# Patient Record
Sex: FEMALE | Race: WHITE | Employment: FULL TIME | URBAN - NONMETROPOLITAN AREA
[De-identification: names, ages, dates, MRNs, and addresses within clinical notes are randomized per-mention and may not be internally consistent; named-entity substitution may affect disease eponyms.]

---

## 2017-05-19 ENCOUNTER — PROCEDURE VISIT (OUTPATIENT)
Dept: PHYSICAL MEDICINE AND REHAB | Age: 32
End: 2017-05-19

## 2017-05-19 DIAGNOSIS — M54.16 LUMBAR RADICULOPATHY: Primary | ICD-10-CM

## 2017-05-19 DIAGNOSIS — M79.604 RIGHT LEG PAIN: ICD-10-CM

## 2017-05-19 PROCEDURE — 95908 NRV CNDJ TST 3-4 STUDIES: CPT | Performed by: PSYCHIATRY & NEUROLOGY

## 2017-05-19 PROCEDURE — 95886 MUSC TEST DONE W/N TEST COMP: CPT | Performed by: PSYCHIATRY & NEUROLOGY

## 2018-01-25 ENCOUNTER — HOSPITAL ENCOUNTER (EMERGENCY)
Age: 33
Discharge: HOME | End: 2018-01-25
Attending: FAMILY MEDICINE
Payer: COMMERCIAL

## 2018-01-25 VITALS
OXYGEN SATURATION: 99 % | HEIGHT: 69 IN | BODY MASS INDEX: 30.07 KG/M2 | SYSTOLIC BLOOD PRESSURE: 135 MMHG | RESPIRATION RATE: 16 BRPM | DIASTOLIC BLOOD PRESSURE: 88 MMHG | HEART RATE: 58 BPM | WEIGHT: 203 LBS | TEMPERATURE: 97.8 F

## 2018-01-25 DIAGNOSIS — J40 BRONCHITIS: ICD-10-CM

## 2018-01-25 DIAGNOSIS — G89.18 POST-OP PAIN: Primary | ICD-10-CM

## 2018-01-25 PROCEDURE — 99282 EMERGENCY DEPT VISIT SF MDM: CPT

## 2018-01-25 RX ORDER — HYDROCODONE BITARTRATE AND ACETAMINOPHEN 5; 325 MG/1; MG/1
1 TABLET ORAL EVERY 6 HOURS PRN
COMMUNITY
End: 2018-01-25 | Stop reason: SINTOL

## 2018-01-25 RX ORDER — SERTRALINE HYDROCHLORIDE 100 MG/1
100 TABLET, FILM COATED ORAL DAILY
COMMUNITY
End: 2018-07-05

## 2018-01-25 RX ORDER — OXYCODONE HYDROCHLORIDE AND ACETAMINOPHEN 5; 325 MG/1; MG/1
1-2 TABLET ORAL EVERY 6 HOURS PRN
Qty: 12 TABLET | Refills: 0 | Status: SHIPPED | OUTPATIENT
Start: 2018-01-25 | End: 2018-02-01

## 2018-01-25 RX ORDER — IBUPROFEN 200 MG
800 TABLET ORAL EVERY 6 HOURS PRN
Status: ON HOLD | COMMUNITY
End: 2019-08-27

## 2018-01-25 ASSESSMENT — ENCOUNTER SYMPTOMS
ABDOMINAL DISTENTION: 0
PHOTOPHOBIA: 0
SHORTNESS OF BREATH: 0
RHINORRHEA: 0
NAUSEA: 1
TROUBLE SWALLOWING: 1
WHEEZING: 1
COUGH: 1
FACIAL SWELLING: 0
CHEST TIGHTNESS: 0
DIARRHEA: 0
EYE REDNESS: 0
COLOR CHANGE: 0
VOICE CHANGE: 0
ABDOMINAL PAIN: 0
EYE PAIN: 0
SORE THROAT: 1
EYE DISCHARGE: 0
CONSTIPATION: 0
STRIDOR: 0

## 2018-01-25 ASSESSMENT — PAIN DESCRIPTION - PAIN TYPE: TYPE: SURGICAL PAIN

## 2018-01-25 ASSESSMENT — PAIN DESCRIPTION - DESCRIPTORS: DESCRIPTORS: BURNING;THROBBING

## 2018-01-25 ASSESSMENT — PAIN DESCRIPTION - FREQUENCY: FREQUENCY: CONTINUOUS

## 2018-01-25 ASSESSMENT — PAIN DESCRIPTION - LOCATION: LOCATION: THROAT

## 2018-01-25 ASSESSMENT — PAIN SCALES - GENERAL: PAINLEVEL_OUTOF10: 10

## 2018-01-25 NOTE — ED PROVIDER NOTES
noted that she was having some coughing, and wheezing. The patient is a smoker I did recommend that she stop smoking. Lung auscultation does show some wheezing. The patient has just recently stopped her prednisone dosing and I did recommend if her symptoms worsen that a beta 2 agonist may need to be added to her regimen. I will provide the patient a short regimen of Percocet for her pain. I recommended that the patient notes any fever or worsening throat pain that she notify her ENT immediately. The patient voiced understanding. CRITICAL CARE:       CONSULTS:  None    PROCEDURES:  None    FINAL IMPRESSION      1. Post-op pain    2. Bronchitis          DISPOSITION/PLAN   Home. Care instructions provided. Follow up with PCP/ENT if symptoms should persist    PATIENT REFERRED TO:  Airam Andre MD  [place] [address] [address]  [phone]    Call in 1 day  If symptoms worsen      DISCHARGE MEDICATIONS:  New Prescriptions    OXYCODONE-ACETAMINOPHEN (PERCOCET) 5-325 MG PER TABLET    Take 1-2 tablets by mouth every 6 hours as needed for Pain for up to 10 doses WARNING:  May cause drowsiness. May impair ability to operate vehicles or machinery. Do not use in combination with alcohol.   .       (Please note that portions of this note were completed with a voice recognition program.  Efforts were made to edit the dictations but occasionally words are mis-transcribed.)    MD Mk Hill MD  01/25/18 3839

## 2018-07-05 ENCOUNTER — HOSPITAL ENCOUNTER (EMERGENCY)
Age: 33
Discharge: HOME | End: 2018-07-05
Attending: EMERGENCY MEDICINE
Payer: COMMERCIAL

## 2018-07-05 VITALS
WEIGHT: 210 LBS | HEART RATE: 105 BPM | BODY MASS INDEX: 31.01 KG/M2 | OXYGEN SATURATION: 96 % | DIASTOLIC BLOOD PRESSURE: 78 MMHG | SYSTOLIC BLOOD PRESSURE: 129 MMHG | RESPIRATION RATE: 16 BRPM | TEMPERATURE: 99.5 F

## 2018-07-05 DIAGNOSIS — S46.001A ROTATOR CUFF INJURY, RIGHT, INITIAL ENCOUNTER: ICD-10-CM

## 2018-07-05 DIAGNOSIS — S46.911A STRAIN OF RIGHT SHOULDER, INITIAL ENCOUNTER: Primary | ICD-10-CM

## 2018-07-05 PROCEDURE — 99283 EMERGENCY DEPT VISIT LOW MDM: CPT

## 2018-07-05 RX ORDER — BUPROPION HYDROCHLORIDE 100 MG/1
125 TABLET ORAL DAILY
COMMUNITY
End: 2019-03-20

## 2018-07-05 ASSESSMENT — PAIN DESCRIPTION - PAIN TYPE: TYPE: ACUTE PAIN

## 2018-07-05 ASSESSMENT — PAIN DESCRIPTION - DESCRIPTORS: DESCRIPTORS: ACHING;BURNING;SHARP

## 2018-07-05 ASSESSMENT — PAIN DESCRIPTION - ORIENTATION: ORIENTATION: RIGHT

## 2018-07-05 ASSESSMENT — PAIN SCALES - GENERAL: PAINLEVEL_OUTOF10: 8

## 2018-07-05 ASSESSMENT — PAIN DESCRIPTION - LOCATION: LOCATION: SHOULDER

## 2018-07-05 NOTE — ED NOTES
Malvin Peterson MD at bedside for evaluation of the patient.           Ivonne Cano RN  07/05/18 4967

## 2018-07-05 NOTE — LETTER
Mildred Lamb  [address] [place] [address]  Phone: [phone]               July 5, 2018    Patient: Sylvia Hernandez   YOB: 1985   Date of Visit: 7/5/2018       To Whom It May Concern:    Rob Smith was seen and treated in our emergency department on 7/5/2018. She may return to work on 7/6/2018.       Sincerely,       Jeremy Redman MD         Signature:__________________________________

## 2018-07-05 NOTE — ED PROVIDER NOTES
has been smoking Cigarettes. She has a 6.00 pack-year smoking history. She has never used smokeless tobacco. She reports that she does not drink alcohol or use drugs. PHYSICAL EXAM     INITIAL VITALS:  weight is 210 lb (95.3 kg). Her temperature is 99.5 °F (37.5 °C). Her blood pressure is 129/78 and her pulse is 105. Her respiration is 16 and oxygen saturation is 96%. Physical Exam   Constitutional: She appears well-developed and well-nourished. No distress. HENT:   Head: Atraumatic. Neck: Normal range of motion. Neck supple. Musculoskeletal:    Examination of the  Of the right shoulder showed normal contour, she has slight pain with  Passive abduction of her right shoulder, she is able  2 days arm over head with minimal pain. She has normal neurovascular examination of right upper extremity. Neurological: She is alert. DIAGNOSTIC RESULTS         LABS:   Labs Reviewed - No data to display    EMERGENCY DEPARTMENT COURSE:   Vitals:    Vitals:    07/05/18 1915   BP: 129/78   Pulse: 105   Resp: 16   Temp: 99.5 °F (37.5 °C)   SpO2: 96%   Weight: 210 lb (95.3 kg)         FINAL IMPRESSION      1. Strain of right shoulder, initial encounter    2. Rotator cuff injury, right, initial encounter          DISPOSITION/PLAN    she was discharged home in stable condition with discharge instructions, advised take Motrin 800 mg every 8 hours as needed, return if worse or new symptoms. PATIENT REFERRED TO:  Cadence Lopez, APRN - CNP  [address]  [facility] [id number]  [phone]    In 2 days        DISCHARGE MEDICATIONS:  New Prescriptions    No medications on file       (Please note that portions of this note were completed with a voice recognition program.  Efforts were made to edit the dictations but occasionally words are mis-transcribed.)    MD Sylvain Dexter MD  07/05/18 6325

## 2018-07-05 NOTE — ED TRIAGE NOTES
Patient presents to the ED via private auto with complaints of right shoulder pain without known injury. Patient states that she woke this morning and her shoulder did not hurt at all but as soon as she got to work it started to hurt and she has increased pain with movement. Patient denies a work injury or lifting anything heavy at work today stating \"I just got to work and had not done anything yet. \"  Patient's pain level is the highest when she is abducting the arm and it is at about a 45 degree angle from the body. States she has occasional numbness and tingling in the arm if she is laying on it. Radial pulse strong.

## 2019-03-20 ENCOUNTER — HOSPITAL ENCOUNTER (EMERGENCY)
Age: 34
Discharge: HOME | End: 2019-03-20
Attending: EMERGENCY MEDICINE
Payer: COMMERCIAL

## 2019-03-20 VITALS
SYSTOLIC BLOOD PRESSURE: 124 MMHG | TEMPERATURE: 98 F | RESPIRATION RATE: 17 BRPM | DIASTOLIC BLOOD PRESSURE: 84 MMHG | OXYGEN SATURATION: 98 % | HEART RATE: 95 BPM

## 2019-03-20 DIAGNOSIS — Z86.59 HISTORY OF ANXIETY: ICD-10-CM

## 2019-03-20 DIAGNOSIS — Z72.0 NICOTINE ABUSE: ICD-10-CM

## 2019-03-20 DIAGNOSIS — J06.9 ACUTE UPPER RESPIRATORY INFECTION: Primary | ICD-10-CM

## 2019-03-20 PROCEDURE — 99283 EMERGENCY DEPT VISIT LOW MDM: CPT

## 2019-03-20 ASSESSMENT — PAIN DESCRIPTION - LOCATION: LOCATION: NOSE

## 2019-03-20 ASSESSMENT — PAIN DESCRIPTION - PAIN TYPE: TYPE: ACUTE PAIN

## 2019-03-20 ASSESSMENT — PAIN DESCRIPTION - DESCRIPTORS: DESCRIPTORS: PRESSURE

## 2019-03-20 ASSESSMENT — PAIN SCALES - GENERAL: PAINLEVEL_OUTOF10: 3

## 2019-03-20 ASSESSMENT — PAIN - FUNCTIONAL ASSESSMENT: PAIN_FUNCTIONAL_ASSESSMENT: ACTIVITIES ARE NOT PREVENTED

## 2019-04-26 ENCOUNTER — HOSPITAL ENCOUNTER (OUTPATIENT)
Age: 34
Discharge: HOME | End: 2019-04-26
Payer: COMMERCIAL

## 2019-04-26 LAB
ABO: NORMAL
ANTIBODY SCREEN: NORMAL
BASOPHILS # BLD: 0.6 %
BASOPHILS ABSOLUTE: 0 THOU/MM3 (ref 0–0.1)
EOSINOPHIL # BLD: 1.3 %
EOSINOPHILS ABSOLUTE: 0.1 THOU/MM3 (ref 0–0.4)
ERYTHROCYTE [DISTWIDTH] IN BLOOD BY AUTOMATED COUNT: 13.1 % (ref 11.5–14.5)
ERYTHROCYTE [DISTWIDTH] IN BLOOD BY AUTOMATED COUNT: 44.7 FL (ref 35–45)
HCT VFR BLD CALC: 41 % (ref 37–47)
HEMOGLOBIN: 13.4 GM/DL (ref 12–16)
HEPATITIS B SURFACE ANTIGEN: NEGATIVE
IMMATURE GRANS (ABS): 0.02 THOU/MM3 (ref 0–0.07)
IMMATURE GRANULOCYTES: 0.3 %
LYMPHOCYTES # BLD: 26.1 %
LYMPHOCYTES ABSOLUTE: 1.7 THOU/MM3 (ref 1–4.8)
MCH RBC QN AUTO: 30.5 PG (ref 26–33)
MCHC RBC AUTO-ENTMCNC: 32.7 GM/DL (ref 32.2–35.5)
MCV RBC AUTO: 93.4 FL (ref 81–99)
MONOCYTES # BLD: 7.8 %
MONOCYTES ABSOLUTE: 0.5 THOU/MM3 (ref 0.4–1.3)
NUCLEATED RED BLOOD CELLS: 0 /100 WBC
PLATELET # BLD: 172 THOU/MM3 (ref 130–400)
PMV BLD AUTO: 11.3 FL (ref 9.4–12.4)
RBC # BLD: 4.39 MILL/MM3 (ref 4.2–5.4)
RH FACTOR: NORMAL
RUBELLA: 59 IU/ML
SEG NEUTROPHILS: 63.9 %
SEGMENTED NEUTROPHILS ABSOLUTE COUNT: 4.1 THOU/MM3 (ref 1.8–7.7)
WBC # BLD: 6.4 THOU/MM3 (ref 4.8–10.8)

## 2019-04-26 PROCEDURE — 86901 BLOOD TYPING SEROLOGIC RH(D): CPT

## 2019-04-26 PROCEDURE — 36415 COLL VENOUS BLD VENIPUNCTURE: CPT

## 2019-04-26 PROCEDURE — 86762 RUBELLA ANTIBODY: CPT

## 2019-04-26 PROCEDURE — 86900 BLOOD TYPING SEROLOGIC ABO: CPT

## 2019-04-26 PROCEDURE — 86850 RBC ANTIBODY SCREEN: CPT

## 2019-04-26 PROCEDURE — 85025 COMPLETE CBC W/AUTO DIFF WBC: CPT

## 2019-04-26 PROCEDURE — 86592 SYPHILIS TEST NON-TREP QUAL: CPT

## 2019-04-26 PROCEDURE — 87340 HEPATITIS B SURFACE AG IA: CPT

## 2019-04-27 LAB — RPR: NONREACTIVE

## 2019-05-01 ENCOUNTER — HOSPITAL ENCOUNTER (EMERGENCY)
Age: 34
Discharge: HOME | End: 2019-05-01
Attending: EMERGENCY MEDICINE
Payer: COMMERCIAL

## 2019-05-01 VITALS
DIASTOLIC BLOOD PRESSURE: 78 MMHG | HEART RATE: 96 BPM | BODY MASS INDEX: 31.84 KG/M2 | SYSTOLIC BLOOD PRESSURE: 132 MMHG | RESPIRATION RATE: 18 BRPM | TEMPERATURE: 98.3 F | HEIGHT: 69 IN | OXYGEN SATURATION: 100 % | WEIGHT: 215 LBS

## 2019-05-01 DIAGNOSIS — N93.9 VAGINAL BLEEDING: Primary | ICD-10-CM

## 2019-05-01 DIAGNOSIS — O20.0 THREATENED MISCARRIAGE: ICD-10-CM

## 2019-05-01 LAB — HCG,BETA SUBUNIT,QUAL,SERUM: ABNORMAL MIU/ML (ref 0–5)

## 2019-05-01 PROCEDURE — 99284 EMERGENCY DEPT VISIT MOD MDM: CPT

## 2019-05-01 PROCEDURE — 84702 CHORIONIC GONADOTROPIN TEST: CPT

## 2019-05-01 PROCEDURE — 36415 COLL VENOUS BLD VENIPUNCTURE: CPT

## 2019-05-01 RX ORDER — ACETAMINOPHEN 500 MG
500 TABLET ORAL EVERY 6 HOURS PRN
COMMUNITY
End: 2022-08-24 | Stop reason: ALTCHOICE

## 2019-05-01 ASSESSMENT — ENCOUNTER SYMPTOMS
WHEEZING: 0
BLOOD IN STOOL: 0
SHORTNESS OF BREATH: 0
DIARRHEA: 0
VOMITING: 0
ABDOMINAL PAIN: 0
SORE THROAT: 0

## 2019-05-01 NOTE — LETTER
Caitlyn   [address] [address] [place] [address]  Phone: [phone]               May 1, 2019    Patient: Pamela Dorsey   YOB: 1985   Date of Visit: 5/1/2019       To Whom It May Concern:    Sathya Hdez was seen and treated in our emergency department on 5/1/2019. She may return to work on 5/2/2019.       Sincerely,       Thalia Olvera MD         Signature:__________________________________

## 2019-05-01 NOTE — ED NOTES
Blade Lima, lab personnel, in to draw blood for lab work as ordered.       Kwame Ba RN  05/01/19 2596

## 2019-05-01 NOTE — ED NOTES
Discharged home in stable condition. AVS discussed/reviewed with patient. Patient verbalized understanding of all instructions given; no questions/concerns voiced. Return to work slip given per patient request. Respirations easy, regular and non-labored; no distress noted. Skin pink, warm and dry; mucous membranes moist. Alert and appropriate for age. Ambulated per self to private vehicle.      Loretta Kiran RN  05/01/19 4076

## 2019-05-01 NOTE — ED NOTES
Dr. Pack Boom in to do pelvic exam. This RN present for exam. Patient tolerated well.      Vijay Desir, ANTIOEN  05/01/19 2301

## 2019-05-01 NOTE — ED PROVIDER NOTES
Medications    ACETAMINOPHEN (TYLENOL) 500 MG TABLET    Take 500 mg by mouth every 6 hours as needed for Pain    ENOXAPARIN (LOVENOX) 40 MG/0.4ML INJECTION    Inject into the skin daily    IBUPROFEN (ADVIL;MOTRIN) 200 MG TABLET    Take 800 mg by mouth every 6 hours as needed for Pain     PRENATAL MULTIVIT-MIN-FE-FA (PRENATAL VITAMINS) 0.8 MG TABS    Take by mouth       ALLERGIES     is allergic to tape [adhesive tape]. FAMILY HISTORY     indicated that her mother is alive. She indicated that her father is alive. She indicated that her brother is alive. She indicated that the status of her maternal grandmother is unknown. She indicated that the status of her maternal grandfather is unknown.   family history includes Heart Disease in her maternal grandfather; High Blood Pressure in her maternal grandmother; Other in her maternal grandmother. SOCIAL HISTORY      reports that she has been smoking cigarettes. She has a 6.00 pack-year smoking history. She has never used smokeless tobacco. She reports that she does not drink alcohol or use drugs. PHYSICAL EXAM     INITIAL VITALS:  height is 5' 9\" (1.753 m) and weight is 215 lb (97.5 kg). Her oral temperature is 98.3 °F (36.8 °C). Her blood pressure is 132/78 and her pulse is 96. Her respiration is 18 and oxygen saturation is 100%. Physical Exam   Constitutional: She appears well-developed and well-nourished. No distress. Eyes: No scleral icterus. Neck: Normal range of motion. Neck supple. No JVD present. Cardiovascular: Normal rate, regular rhythm and normal heart sounds. Exam reveals no gallop and no friction rub. No murmur heard. Pulmonary/Chest: Effort normal and breath sounds normal. No respiratory distress. Abdominal: Soft. Bowel sounds are normal. She exhibits no distension and no mass. There is no tenderness. Genitourinary: There is no lesion on the right labia. There is no lesion on the left labia.  Cervix exhibits no discharge and no friability. There is bleeding in the vagina. No vaginal discharge found. Genitourinary Comments: The cervical os is closed, there is no active bleeding. There is a very small amount of fresh blood in the vaginal vault. Musculoskeletal: She exhibits no edema or tenderness. Neurological: She is alert. Psychiatric: She has a normal mood and affect. DIFFERENTIAL DIAGNOSIS:       DIAGNOSTIC RESULTS     LABS:   Labs Reviewed   HCG, QUANTITATIVE, PREGNANCY       EMERGENCY DEPARTMENT COURSE:   Vitals:    Vitals:    05/01/19 0209   BP: 132/78   Pulse: 96   Resp: 18   Temp: 98.3 °F (36.8 °C)   TempSrc: Oral   SpO2: 100%   Weight: 215 lb (97.5 kg)   Height: 5' 9\" (1.753 m)     Quantitative beta hCG was obtained    FINAL IMPRESSION      1. Vaginal bleeding    2. Threatened miscarriage          DISPOSITION/PLAN   She was discharged home in stable condition, she was given discharge instructions and was advised to return if worse or new symptoms.     PATIENT REFERRED TO:  DO John Martinez [address] [place] [address]  [phone]    In 1 day        DISCHARGE MEDICATIONS:  New Prescriptions    No medications on file       (Please note that portions of this note were completed with a voice recognition program.  Efforts were made to edit the dictations but occasionally words are mis-transcribed.)    MD Emiliano Lomeli MD  05/01/19 8645

## 2019-05-01 NOTE — ED NOTES
Warm blanket given; patient instructed to take pants and underwear for exam. Chux placed on bed.      Kwame Ba RN  05/01/19 2570

## 2019-05-01 NOTE — ED TRIAGE NOTES
Patient presents per ambulation/self with c/o vaginal bleeding with possible miscarriage. Patient states she is 7 1/2 weeks pregnant and woke up about 0130 with her underwear all bloody. Denies any cramping or noting of clots. Respirations easy, regular and non-labored; no distress noted. Skin pink, warm and dry; mucous membranes moist. Alert and appropriate for age. Ambulated to exam room 4 for triage. Patient sitting in chair. Warm blanket offered. Dr. Marilou Mead made aware of patient.

## 2019-06-07 ENCOUNTER — HOSPITAL ENCOUNTER (OUTPATIENT)
Age: 34
Discharge: HOME | End: 2019-06-07
Payer: COMMERCIAL

## 2019-06-07 LAB
BASOPHILS # BLD: 0.4 %
BASOPHILS ABSOLUTE: 0 THOU/MM3 (ref 0–0.1)
EOSINOPHIL # BLD: 0.9 %
EOSINOPHILS ABSOLUTE: 0.1 THOU/MM3 (ref 0–0.4)
ERYTHROCYTE [DISTWIDTH] IN BLOOD BY AUTOMATED COUNT: 12.9 % (ref 11.5–14.5)
ERYTHROCYTE [DISTWIDTH] IN BLOOD BY AUTOMATED COUNT: 43.7 FL (ref 35–45)
HCT VFR BLD CALC: 36.7 % (ref 37–47)
HEMOGLOBIN: 12.2 GM/DL (ref 12–16)
IMMATURE GRANS (ABS): 0.02 THOU/MM3 (ref 0–0.07)
IMMATURE GRANULOCYTES: 0.3 %
LYMPHOCYTES # BLD: 20.4 %
LYMPHOCYTES ABSOLUTE: 1.5 THOU/MM3 (ref 1–4.8)
MCH RBC QN AUTO: 30.8 PG (ref 26–33)
MCHC RBC AUTO-ENTMCNC: 33.2 GM/DL (ref 32.2–35.5)
MCV RBC AUTO: 92.7 FL (ref 81–99)
MONOCYTES # BLD: 6 %
MONOCYTES ABSOLUTE: 0.5 THOU/MM3 (ref 0.4–1.3)
NUCLEATED RED BLOOD CELLS: 0 /100 WBC
PLATELET # BLD: 177 THOU/MM3 (ref 130–400)
PMV BLD AUTO: 11.5 FL (ref 9.4–12.4)
RBC # BLD: 3.96 MILL/MM3 (ref 4.2–5.4)
SEG NEUTROPHILS: 72 %
SEGMENTED NEUTROPHILS ABSOLUTE COUNT: 5.4 THOU/MM3 (ref 1.8–7.7)
WBC # BLD: 7.5 THOU/MM3 (ref 4.8–10.8)

## 2019-06-07 PROCEDURE — 36415 COLL VENOUS BLD VENIPUNCTURE: CPT

## 2019-06-07 PROCEDURE — 85520 HEPARIN ASSAY: CPT

## 2019-06-07 PROCEDURE — 85025 COMPLETE CBC W/AUTO DIFF WBC: CPT

## 2019-06-08 LAB — HEPARIN LOW MOLECULAR WEIGHT: 0.06 U/ML

## 2019-08-09 ENCOUNTER — HOSPITAL ENCOUNTER (OUTPATIENT)
Age: 34
Discharge: HOME | End: 2019-08-09
Payer: COMMERCIAL

## 2019-08-09 LAB
BASOPHILS # BLD: 0.2 %
BASOPHILS ABSOLUTE: 0 THOU/MM3 (ref 0–0.1)
EOSINOPHIL # BLD: 1 %
EOSINOPHILS ABSOLUTE: 0.1 THOU/MM3 (ref 0–0.4)
ERYTHROCYTE [DISTWIDTH] IN BLOOD BY AUTOMATED COUNT: 12.9 % (ref 11.5–14.5)
ERYTHROCYTE [DISTWIDTH] IN BLOOD BY AUTOMATED COUNT: 43.8 FL (ref 35–45)
HCT VFR BLD CALC: 40.2 % (ref 37–47)
HEMOGLOBIN: 13 GM/DL (ref 12–16)
HEPARIN LOW MOLECULAR WEIGHT: 0.03 U/ML
IMMATURE GRANS (ABS): 0.04 THOU/MM3 (ref 0–0.07)
IMMATURE GRANULOCYTES: 0.5 %
LYMPHOCYTES # BLD: 19.8 %
LYMPHOCYTES ABSOLUTE: 1.7 THOU/MM3 (ref 1–4.8)
MCH RBC QN AUTO: 30.2 PG (ref 26–33)
MCHC RBC AUTO-ENTMCNC: 32.3 GM/DL (ref 32.2–35.5)
MCV RBC AUTO: 93.5 FL (ref 81–99)
MONOCYTES # BLD: 5.5 %
MONOCYTES ABSOLUTE: 0.5 THOU/MM3 (ref 0.4–1.3)
NUCLEATED RED BLOOD CELLS: 0 /100 WBC
PLATELET # BLD: 158 THOU/MM3 (ref 130–400)
PMV BLD AUTO: 12 FL (ref 9.4–12.4)
RBC # BLD: 4.3 MILL/MM3 (ref 4.2–5.4)
SEG NEUTROPHILS: 73 %
SEGMENTED NEUTROPHILS ABSOLUTE COUNT: 6.1 THOU/MM3 (ref 1.8–7.7)
WBC # BLD: 8.4 THOU/MM3 (ref 4.8–10.8)

## 2019-08-09 PROCEDURE — 36415 COLL VENOUS BLD VENIPUNCTURE: CPT

## 2019-08-09 PROCEDURE — 85520 HEPARIN ASSAY: CPT

## 2019-08-09 PROCEDURE — 85025 COMPLETE CBC W/AUTO DIFF WBC: CPT

## 2019-08-27 PROBLEM — R10.9 ABDOMINAL PAIN: Status: ACTIVE | Noted: 2019-08-27

## 2019-09-08 ENCOUNTER — HOSPITAL ENCOUNTER (OUTPATIENT)
Age: 34
Discharge: HOME | End: 2019-09-08
Payer: COMMERCIAL

## 2019-09-08 LAB
BASOPHILS # BLD: 0.5 %
BASOPHILS ABSOLUTE: 0 THOU/MM3 (ref 0–0.1)
EOSINOPHIL # BLD: 1.3 %
EOSINOPHILS ABSOLUTE: 0.1 THOU/MM3 (ref 0–0.4)
ERYTHROCYTE [DISTWIDTH] IN BLOOD BY AUTOMATED COUNT: 13.8 % (ref 11.5–14.5)
ERYTHROCYTE [DISTWIDTH] IN BLOOD BY AUTOMATED COUNT: 46 FL (ref 35–45)
GESTATIONAL GLUCOSE SCREEN: 110 MG/DL (ref 69–140)
GESTATIONAL SCREEN BASELINE: 82 MG/DL (ref 70–108)
HCT VFR BLD CALC: 38.3 % (ref 37–47)
HEMOGLOBIN: 12.7 GM/DL (ref 12–16)
IMMATURE GRANS (ABS): 0.03 THOU/MM3 (ref 0–0.07)
IMMATURE GRANULOCYTES: 0 %
LYMPHOCYTES # BLD: 26.8 %
LYMPHOCYTES ABSOLUTE: 2.1 THOU/MM3 (ref 1–4.8)
MCH RBC QN AUTO: 30.5 PG (ref 26–33)
MCHC RBC AUTO-ENTMCNC: 33.2 GM/DL (ref 32.2–35.5)
MCV RBC AUTO: 92.1 FL (ref 81–99)
MONOCYTES # BLD: 7.3 %
MONOCYTES ABSOLUTE: 0.6 THOU/MM3 (ref 0.4–1.3)
NUCLEATED RED BLOOD CELLS: 0 /100 WBC
PLATELET # BLD: 141 THOU/MM3 (ref 130–400)
PMV BLD AUTO: 12.7 FL (ref 9.4–12.4)
RBC # BLD: 4.16 MILL/MM3 (ref 4.2–5.4)
SEG NEUTROPHILS: 63.7 %
SEGMENTED NEUTROPHILS ABSOLUTE COUNT: 5 THOU/MM3 (ref 1.8–7.7)
WBC # BLD: 7.8 THOU/MM3 (ref 4.8–10.8)

## 2019-09-08 PROCEDURE — 82947 ASSAY GLUCOSE BLOOD QUANT: CPT

## 2019-09-08 PROCEDURE — 36415 COLL VENOUS BLD VENIPUNCTURE: CPT

## 2019-09-08 PROCEDURE — 85025 COMPLETE CBC W/AUTO DIFF WBC: CPT

## 2019-09-08 PROCEDURE — 82950 GLUCOSE TEST: CPT

## 2020-08-22 ENCOUNTER — HOSPITAL ENCOUNTER (EMERGENCY)
Age: 35
Discharge: HOME | End: 2020-08-22
Attending: EMERGENCY MEDICINE
Payer: MEDICARE

## 2020-08-22 VITALS
HEART RATE: 81 BPM | OXYGEN SATURATION: 96 % | HEIGHT: 69 IN | DIASTOLIC BLOOD PRESSURE: 80 MMHG | SYSTOLIC BLOOD PRESSURE: 118 MMHG | BODY MASS INDEX: 30.66 KG/M2 | TEMPERATURE: 98.9 F | RESPIRATION RATE: 14 BRPM | WEIGHT: 207 LBS

## 2020-08-22 LAB
GROUP A STREP CULTURE, REFLEX: NEGATIVE
REFLEX THROAT C + S: NORMAL

## 2020-08-22 PROCEDURE — 87070 CULTURE OTHR SPECIMN AEROBIC: CPT

## 2020-08-22 PROCEDURE — 99283 EMERGENCY DEPT VISIT LOW MDM: CPT

## 2020-08-22 PROCEDURE — 87880 STREP A ASSAY W/OPTIC: CPT

## 2020-08-22 PROCEDURE — 99282 EMERGENCY DEPT VISIT SF MDM: CPT

## 2020-08-22 RX ORDER — SERTRALINE HYDROCHLORIDE 100 MG/1
TABLET, FILM COATED ORAL
COMMUNITY
Start: 2020-08-21 | End: 2022-04-21

## 2020-08-22 RX ORDER — AZITHROMYCIN 250 MG/1
TABLET, FILM COATED ORAL
Qty: 1 PACKET | Refills: 0 | Status: SHIPPED | OUTPATIENT
Start: 2020-08-22 | End: 2020-08-26

## 2020-08-22 ASSESSMENT — PAIN DESCRIPTION - LOCATION
LOCATION: THROAT
LOCATION: THROAT

## 2020-08-22 ASSESSMENT — PAIN SCALES - GENERAL
PAINLEVEL_OUTOF10: 8
PAINLEVEL_OUTOF10: 8

## 2020-08-22 NOTE — ED NOTES
Pt alert and oriented. Respirations regular and easy. Prescriptions given and instructed on. Discharge instructions reviewed. States understanding. Pt discharged in satisfactory condition.          Carolin Black RN  08/22/20 8436

## 2020-08-22 NOTE — ED PROVIDER NOTES
[facility]  eMERGENCY dEPARTMENT eNCOUnter             Oanh Beard 19 COMPLAINT    Chief Complaint   Patient presents with    Pharyngitis     for 2 months    Nasal Congestion    Cough       Nurses Notes reviewed and I agree except as noted in the HPI. HPI    Beverley Diehl is a 28 y.o. female who presents with a 2 month history of sore throat, now sinus drainage and cough for the last few days, no fever. OTC medication not helping. Pain 3/10, increased with swallowing. REVIEW OF SYSTEMS      Review of Systems   Constitutional: Positive for fever and malaise/fatigue. HENT: Positive for congestion, ear pain, sinus pain and sore throat. Respiratory: Negative for shortness of breath and wheezing. Cardiovascular: Negative for chest pain and palpitations. Gastrointestinal: Negative for abdominal pain and nausea. Skin: Negative for rash. Neurological: Positive for headaches. Negative for dizziness and focal weakness. All other systems reviewed and are negative. PAST MEDICAL HISTORY     has a past medical history of Anxiety, Blood clotting disorder ([address] Depression, Heterozygous hemoglobin S ([facility] [address] Laceration, and Unspecified diseases of blood and blood-forming organs. SURGICAL HISTORY     has a past surgical history that includes back surgery (); [place] tooth extraction; back surgery; Uterine Suspension;  section (13); and Tonsillectomy. CURRENT MEDICATIONS    Discharge Medication List as of 2020 12:16 PM      CONTINUE these medications which have NOT CHANGED    Details   sertraline (ZOLOFT) 100 MG tablet take 1 and 1/2 tablets by mouth at bedtimeHistorical Med      acetaminophen (TYLENOL) 500 MG tablet Take 500 mg by mouth every 6 hours as needed for PainHistorical Med             ALLERGIES    is allergic to tape [adhesive tape]. FAMILY HISTORY    She indicated that her mother is alive.  She indicated that her father is alive. She indicated that her brother is alive. She indicated that the status of her maternal grandmother is unknown. She indicated that the status of her maternal grandfather is unknown.   family history includes Heart Disease in her maternal grandfather; High Blood Pressure in her maternal grandmother; Other in her maternal grandmother. SOCIAL HISTORY     reports that she has been smoking cigarettes. She has a 6.00 pack-year smoking history. She has never used smokeless tobacco. She reports that she does not drink alcohol or use drugs. PHYSICAL EXAM       INITIAL VITALS: /80   Pulse 81   Temp 98.9 °F (37.2 °C) (Temporal)   Resp 14   Ht 5' 9\" (1.753 m)   Wt 207 lb (93.9 kg)   SpO2 96%   BMI 30.57 kg/m²    Physical Exam  Vitals signs and nursing note reviewed. Constitutional:       General: She is not in acute distress. Appearance: She is well-developed. HENT:      Right Ear: Ear canal normal.      Left Ear: Ear canal normal.      Ears:      Comments: TM's pink and dull     Nose: Congestion present. Mouth/Throat:      Mouth: Mucous membranes are moist. No oral lesions. Pharynx: Posterior oropharyngeal erythema present. No pharyngeal swelling or oropharyngeal exudate. Tonsils: No tonsillar exudate. Eyes:      Conjunctiva/sclera: Conjunctivae normal.      Pupils: Pupils are equal, round, and reactive to light. Neck:      Musculoskeletal: Neck supple. Cardiovascular:      Rate and Rhythm: Normal rate and regular rhythm. Heart sounds: No murmur. Pulmonary:      Effort: Pulmonary effort is normal. No respiratory distress. Breath sounds: Normal breath sounds. No wheezing. Lymphadenopathy:      Cervical: Cervical adenopathy (anterior) present. Skin:     General: Skin is warm and dry. Findings: No rash. Neurological:      General: No focal deficit present. Mental Status: She is alert and oriented to person, place, and time.    Psychiatric:

## 2020-08-22 NOTE — ED NOTES
Pt presents w/ c/o sore throat for approximately 2 months. Today she woke w/ sinus drainage and a dry cough. Respirations regular and easy. No distress noted.       Anatoliy Pabon RN  08/22/20 9189

## 2020-08-23 ASSESSMENT — ENCOUNTER SYMPTOMS
NAUSEA: 0
WHEEZING: 0
SORE THROAT: 1
SINUS PAIN: 1
ABDOMINAL PAIN: 0
SHORTNESS OF BREATH: 0

## 2020-08-24 ENCOUNTER — CARE COORDINATION (OUTPATIENT)
Dept: CARE COORDINATION | Age: 35
End: 2020-08-24

## 2020-08-24 LAB — THROAT/NOSE CULTURE: NORMAL

## 2020-08-24 NOTE — CARE COORDINATION
Patient contacted regarding Rossy Herring. Discussed COVID-19 related testing which was not done at this time. Test results were not done. Patient informed of results, if available? n/a    Care Transition Nurse/ Ambulatory Care Manager contacted the patient by telephone to perform post discharge assessment. Verified name and  with patient as identifiers. Provided introduction to self, and explanation of the CTN/ACM role, and reason for call due to risk factors for infection and/or exposure to COVID-19. Symptoms reviewed with patient who verbalized the following symptoms: cough. Due to no new or worsening symptoms encounter was not routed to provider for escalation. Discussed follow-up appointments. If no appointment was previously scheduled, appointment scheduling offered: n/a  [facility] follow up appointment(s): No future appointments. Non-[facility] follow up appointment(s): pt reports symptoms are improving. Will f/u with her PCP if    Symptoms return or worsen  Advance Care Planning:   Does patient have an Advance Directive:  decision maker updated. Patient has following risk factors of: no known risk factors. CTN/ACM reviewed discharge instructions, medical action plan and red flags such as increased shortness of breath, increasing fever and signs of decompensation with patient who verbalized understanding. Discussed exposure protocols and quarantine with CDC Guidelines What to do if you are sick with coronavirus disease .  Patient was given an opportunity for questions and concerns. The patient agrees to contact the [facility] exposure line [phone], [facility]: ([phone]) and PCP office for questions related to their healthcare. CTN/ACM provided contact information for future needs.     Reviewed and educated patient on any new and changed medications related to discharge diagnosis     Patient/family/caregiver given information for [facility] and agrees to enroll no  Patient's preferred e-mail: n/a2   Patient's preferred phone number: n/a  Based on Loop alert triggers, patient will be contacted by nurse care manager for worsening symptoms. Plan for follow-up call in 5-7 days based on severity of symptoms and risk factors. Pt reports that she is feeling a little better. Taking atb prescribed. Declines Loop.

## 2020-08-31 ENCOUNTER — CARE COORDINATION (OUTPATIENT)
Dept: CARE COORDINATION | Age: 35
End: 2020-08-31

## 2020-08-31 NOTE — CARE COORDINATION
You Patient resolved from the Care Transitions episode on 8/31  Discussed COVID-19 related testing which was not done at this time. Test results were not done. Patient informed of results, if available? n/a    Patient/family has been provided the following resources and education related to COVID-19:                         Signs, symptoms and red flags related to COVID-19            [facility] exposure and quarantine guidelines            [facility] exposure contact - [phone]            Contact for their local Department of Health                 Patient currently reports that the following symptoms have improved:pt reports pharyngitis has resolved. Overall feels much better. No further outreach scheduled with this CTN/ACM. Episode of Care resolved. Patient has this CTN/ACM contact information if future needs arise. Declines need for further f/u. Denies development of any new s/s since her visit. Encouraged f/u with PCP if any new symptoms or concerns arise.

## 2020-12-28 ENCOUNTER — HOSPITAL ENCOUNTER (OUTPATIENT)
Age: 35
Setting detail: SPECIMEN
Discharge: HOME | End: 2020-12-28
Payer: MEDICARE

## 2020-12-28 PROCEDURE — U0003 INFECTIOUS AGENT DETECTION BY NUCLEIC ACID (DNA OR RNA); SEVERE ACUTE RESPIRATORY SYNDROME CORONAVIRUS 2 (SARS-COV-2) (CORONAVIRUS DISEASE [COVID-19]), AMPLIFIED PROBE TECHNIQUE, MAKING USE OF HIGH THROUGHPUT TECHNOLOGIES AS DESCRIBED BY CMS-2020-01-R: HCPCS

## 2020-12-30 LAB — SARS-COV-2: DETECTED

## 2021-01-16 ENCOUNTER — HOSPITAL ENCOUNTER (OUTPATIENT)
Age: 36
Discharge: HOME | End: 2021-01-16
Payer: MEDICARE

## 2021-01-16 LAB
CHOLESTEROL, FASTING: 147 MG/DL (ref 100–199)
EKG ATRIAL RATE: 79 BPM
EKG P AXIS: 55 DEGREES
EKG P-R INTERVAL: 156 MS
EKG Q-T INTERVAL: 372 MS
EKG QRS DURATION: 90 MS
EKG QTC CALCULATION (BAZETT): 426 MS
EKG R AXIS: 61 DEGREES
EKG T AXIS: 25 DEGREES
EKG VENTRICULAR RATE: 79 BPM
GLUCOSE FASTING: 101 MG/DL (ref 70–108)
HDLC SERPL-MCNC: 43 MG/DL
LDL CHOLESTEROL CALCULATED: 95 MG/DL
TRIGLYCERIDE, FASTING: 43 MG/DL (ref 0–199)
TSH SERPL DL<=0.05 MIU/L-ACNC: 1.67 UIU/ML (ref 0.4–4.2)

## 2021-01-16 PROCEDURE — 82947 ASSAY GLUCOSE BLOOD QUANT: CPT

## 2021-01-16 PROCEDURE — 93005 ELECTROCARDIOGRAM TRACING: CPT | Performed by: OBSTETRICS & GYNECOLOGY

## 2021-01-16 PROCEDURE — 84443 ASSAY THYROID STIM HORMONE: CPT

## 2021-01-16 PROCEDURE — 80061 LIPID PANEL: CPT

## 2021-01-16 PROCEDURE — 36415 COLL VENOUS BLD VENIPUNCTURE: CPT

## 2021-03-15 ENCOUNTER — HOSPITAL ENCOUNTER (OUTPATIENT)
Age: 36
Discharge: HOME | End: 2021-03-15
Payer: MEDICARE

## 2021-03-15 PROCEDURE — U0003 INFECTIOUS AGENT DETECTION BY NUCLEIC ACID (DNA OR RNA); SEVERE ACUTE RESPIRATORY SYNDROME CORONAVIRUS 2 (SARS-COV-2) (CORONAVIRUS DISEASE [COVID-19]), AMPLIFIED PROBE TECHNIQUE, MAKING USE OF HIGH THROUGHPUT TECHNOLOGIES AS DESCRIBED BY CMS-2020-01-R: HCPCS

## 2021-03-15 NOTE — PROGRESS NOTES
NPO after midnight except sip of water with heart/BP meds  Follow all instructions given by surgeon including medications to hold  Bring insurance card and photo ID  Shower the night before or morning of procedure with liquid antibacterial soap  Wear comfortable clothing  Do not bring jewelry or valuables  Bring list of medications with dosage and how often taken if not reviewed   needed at discharge at least 25years old  Call PAT at [phone] for questions    Instructed to call surgery center at [phone] upon arrival to speak with  before entering building. Covid screen due  at [facility] 6 to 7 days before procedure.  Pt plans to have completed on 3-15 at [facility]    Covid screening questionnaire complete and negative for symptoms or exposure see chart for documentation

## 2021-03-17 LAB — SARS-COV-2: NOT DETECTED
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
## 2021-03-22 ENCOUNTER — ANESTHESIA EVENT (OUTPATIENT)
Dept: OPERATING ROOM | Age: 36
End: 2021-03-22
Payer: MEDICARE

## 2021-03-22 ENCOUNTER — ANESTHESIA (OUTPATIENT)
Dept: OPERATING ROOM | Age: 36
End: 2021-03-22
Payer: MEDICARE

## 2021-03-22 ENCOUNTER — HOSPITAL ENCOUNTER (OUTPATIENT)
Age: 36
Setting detail: OUTPATIENT SURGERY
Discharge: HOME | End: 2021-03-22
Attending: OBSTETRICS & GYNECOLOGY | Admitting: OBSTETRICS & GYNECOLOGY
Payer: MEDICARE

## 2021-03-22 VITALS
RESPIRATION RATE: 17 BRPM | OXYGEN SATURATION: 98 % | SYSTOLIC BLOOD PRESSURE: 113 MMHG | DIASTOLIC BLOOD PRESSURE: 71 MMHG | TEMPERATURE: 98.6 F

## 2021-03-22 VITALS
WEIGHT: 170.8 LBS | HEIGHT: 69 IN | BODY MASS INDEX: 25.3 KG/M2 | OXYGEN SATURATION: 97 % | HEART RATE: 80 BPM | SYSTOLIC BLOOD PRESSURE: 111 MMHG | TEMPERATURE: 97.8 F | RESPIRATION RATE: 8 BRPM | DIASTOLIC BLOOD PRESSURE: 65 MMHG

## 2021-03-22 DIAGNOSIS — G89.18 POST-OP PAIN: Primary | ICD-10-CM

## 2021-03-22 LAB — PREGNANCY, URINE: NEGATIVE

## 2021-03-22 PROCEDURE — 2580000003 HC RX 258: Performed by: OBSTETRICS & GYNECOLOGY

## 2021-03-22 PROCEDURE — 2709999900 HC NON-CHARGEABLE SUPPLY: Performed by: OBSTETRICS & GYNECOLOGY

## 2021-03-22 PROCEDURE — 6360000002 HC RX W HCPCS: Performed by: OBSTETRICS & GYNECOLOGY

## 2021-03-22 PROCEDURE — 7100000000 HC PACU RECOVERY - FIRST 15 MIN: Performed by: OBSTETRICS & GYNECOLOGY

## 2021-03-22 PROCEDURE — S2900 ROBOTIC SURGICAL SYSTEM: HCPCS | Performed by: OBSTETRICS & GYNECOLOGY

## 2021-03-22 PROCEDURE — 6360000002 HC RX W HCPCS: Performed by: NURSE ANESTHETIST, CERTIFIED REGISTERED

## 2021-03-22 PROCEDURE — 2500000003 HC RX 250 WO HCPCS: Performed by: ANESTHESIOLOGY

## 2021-03-22 PROCEDURE — 81025 URINE PREGNANCY TEST: CPT

## 2021-03-22 PROCEDURE — 7100000010 HC PHASE II RECOVERY - FIRST 15 MIN: Performed by: OBSTETRICS & GYNECOLOGY

## 2021-03-22 PROCEDURE — 3600000019 HC SURGERY ROBOT ADDTL 15MIN: Performed by: OBSTETRICS & GYNECOLOGY

## 2021-03-22 PROCEDURE — 88307 TISSUE EXAM BY PATHOLOGIST: CPT

## 2021-03-22 PROCEDURE — 3700000001 HC ADD 15 MINUTES (ANESTHESIA): Performed by: OBSTETRICS & GYNECOLOGY

## 2021-03-22 PROCEDURE — 7100000001 HC PACU RECOVERY - ADDTL 15 MIN: Performed by: OBSTETRICS & GYNECOLOGY

## 2021-03-22 PROCEDURE — 7100000011 HC PHASE II RECOVERY - ADDTL 15 MIN: Performed by: OBSTETRICS & GYNECOLOGY

## 2021-03-22 PROCEDURE — 3700000000 HC ANESTHESIA ATTENDED CARE: Performed by: OBSTETRICS & GYNECOLOGY

## 2021-03-22 PROCEDURE — 3600000009 HC SURGERY ROBOT BASE: Performed by: OBSTETRICS & GYNECOLOGY

## 2021-03-22 PROCEDURE — 2500000003 HC RX 250 WO HCPCS: Performed by: NURSE ANESTHETIST, CERTIFIED REGISTERED

## 2021-03-22 PROCEDURE — 6360000002 HC RX W HCPCS: Performed by: ANESTHESIOLOGY

## 2021-03-22 PROCEDURE — 2720000010 HC SURG SUPPLY STERILE: Performed by: OBSTETRICS & GYNECOLOGY

## 2021-03-22 PROCEDURE — 2500000003 HC RX 250 WO HCPCS: Performed by: OBSTETRICS & GYNECOLOGY

## 2021-03-22 PROCEDURE — 2580000003 HC RX 258: Performed by: ANESTHESIOLOGY

## 2021-03-22 PROCEDURE — 2780000010 HC IMPLANT OTHER: Performed by: OBSTETRICS & GYNECOLOGY

## 2021-03-22 RX ORDER — FENTANYL CITRATE 50 UG/ML
50 INJECTION, SOLUTION INTRAMUSCULAR; INTRAVENOUS EVERY 5 MIN PRN
Status: DISCONTINUED | OUTPATIENT
Start: 2021-03-22 | End: 2021-03-22 | Stop reason: HOSPADM

## 2021-03-22 RX ORDER — SODIUM CHLORIDE, SODIUM LACTATE, POTASSIUM CHLORIDE, CALCIUM CHLORIDE 600; 310; 30; 20 MG/100ML; MG/100ML; MG/100ML; MG/100ML
INJECTION, SOLUTION INTRAVENOUS CONTINUOUS PRN
Status: DISCONTINUED | OUTPATIENT
Start: 2021-03-22 | End: 2021-03-22 | Stop reason: SDUPTHER

## 2021-03-22 RX ORDER — ACETAMINOPHEN 325 MG/1
650 TABLET ORAL EVERY 4 HOURS PRN
Status: DISCONTINUED | OUTPATIENT
Start: 2021-03-22 | End: 2021-03-22 | Stop reason: HOSPADM

## 2021-03-22 RX ORDER — MIDAZOLAM HYDROCHLORIDE 1 MG/ML
INJECTION INTRAMUSCULAR; INTRAVENOUS PRN
Status: DISCONTINUED | OUTPATIENT
Start: 2021-03-22 | End: 2021-03-22 | Stop reason: SDUPTHER

## 2021-03-22 RX ORDER — OXYCODONE HYDROCHLORIDE AND ACETAMINOPHEN 5; 325 MG/1; MG/1
1 TABLET ORAL EVERY 4 HOURS PRN
Status: DISCONTINUED | OUTPATIENT
Start: 2021-03-22 | End: 2021-03-22 | Stop reason: HOSPADM

## 2021-03-22 RX ORDER — PROPOFOL 10 MG/ML
INJECTION, EMULSION INTRAVENOUS PRN
Status: DISCONTINUED | OUTPATIENT
Start: 2021-03-22 | End: 2021-03-22 | Stop reason: SDUPTHER

## 2021-03-22 RX ORDER — KETOROLAC TROMETHAMINE 30 MG/ML
30 INJECTION, SOLUTION INTRAMUSCULAR; INTRAVENOUS EVERY 6 HOURS
Status: DISCONTINUED | OUTPATIENT
Start: 2021-03-22 | End: 2021-03-22 | Stop reason: HOSPADM

## 2021-03-22 RX ORDER — ONDANSETRON 2 MG/ML
INJECTION INTRAMUSCULAR; INTRAVENOUS PRN
Status: DISCONTINUED | OUTPATIENT
Start: 2021-03-22 | End: 2021-03-22 | Stop reason: SDUPTHER

## 2021-03-22 RX ORDER — ONDANSETRON 2 MG/ML
4 INJECTION INTRAMUSCULAR; INTRAVENOUS
Status: COMPLETED | OUTPATIENT
Start: 2021-03-22 | End: 2021-03-22

## 2021-03-22 RX ORDER — FENTANYL CITRATE 50 UG/ML
INJECTION, SOLUTION INTRAMUSCULAR; INTRAVENOUS PRN
Status: DISCONTINUED | OUTPATIENT
Start: 2021-03-22 | End: 2021-03-22 | Stop reason: SDUPTHER

## 2021-03-22 RX ORDER — GLYCOPYRROLATE 1 MG/5 ML
SYRINGE (ML) INTRAVENOUS PRN
Status: DISCONTINUED | OUTPATIENT
Start: 2021-03-22 | End: 2021-03-22 | Stop reason: SDUPTHER

## 2021-03-22 RX ORDER — SODIUM CHLORIDE, SODIUM LACTATE, POTASSIUM CHLORIDE, CALCIUM CHLORIDE 600; 310; 30; 20 MG/100ML; MG/100ML; MG/100ML; MG/100ML
INJECTION, SOLUTION INTRAVENOUS CONTINUOUS
Status: DISCONTINUED | OUTPATIENT
Start: 2021-03-22 | End: 2021-03-22 | Stop reason: HOSPADM

## 2021-03-22 RX ORDER — OXYCODONE HYDROCHLORIDE AND ACETAMINOPHEN 5; 325 MG/1; MG/1
2 TABLET ORAL EVERY 4 HOURS PRN
Status: DISCONTINUED | OUTPATIENT
Start: 2021-03-22 | End: 2021-03-22 | Stop reason: HOSPADM

## 2021-03-22 RX ORDER — OXYCODONE HYDROCHLORIDE AND ACETAMINOPHEN 5; 325 MG/1; MG/1
1 TABLET ORAL EVERY 6 HOURS PRN
Qty: 15 TABLET | Refills: 0 | Status: SHIPPED | OUTPATIENT
Start: 2021-03-22 | End: 2021-03-29

## 2021-03-22 RX ORDER — DEXAMETHASONE SODIUM PHOSPHATE 4 MG/ML
INJECTION, SOLUTION INTRA-ARTICULAR; INTRALESIONAL; INTRAMUSCULAR; INTRAVENOUS; SOFT TISSUE PRN
Status: DISCONTINUED | OUTPATIENT
Start: 2021-03-22 | End: 2021-03-22 | Stop reason: SDUPTHER

## 2021-03-22 RX ORDER — NEOSTIGMINE METHYLSULFATE 5 MG/5 ML
SYRINGE (ML) INTRAVENOUS PRN
Status: DISCONTINUED | OUTPATIENT
Start: 2021-03-22 | End: 2021-03-22 | Stop reason: SDUPTHER

## 2021-03-22 RX ORDER — BUPIVACAINE HYDROCHLORIDE 5 MG/ML
INJECTION, SOLUTION EPIDURAL; INTRACAUDAL PRN
Status: DISCONTINUED | OUTPATIENT
Start: 2021-03-22 | End: 2021-03-22 | Stop reason: ALTCHOICE

## 2021-03-22 RX ORDER — SODIUM CHLORIDE, SODIUM LACTATE, POTASSIUM CHLORIDE, CALCIUM CHLORIDE 600; 310; 30; 20 MG/100ML; MG/100ML; MG/100ML; MG/100ML
INJECTION, SOLUTION INTRAVENOUS SEE ADMIN INSTRUCTIONS
Status: DISCONTINUED | OUTPATIENT
Start: 2021-03-22 | End: 2021-03-22 | Stop reason: HOSPADM

## 2021-03-22 RX ORDER — HYDROMORPHONE HCL 110MG/55ML
PATIENT CONTROLLED ANALGESIA SYRINGE INTRAVENOUS PRN
Status: DISCONTINUED | OUTPATIENT
Start: 2021-03-22 | End: 2021-03-22 | Stop reason: SDUPTHER

## 2021-03-22 RX ORDER — KETOROLAC TROMETHAMINE 30 MG/ML
30 INJECTION, SOLUTION INTRAMUSCULAR; INTRAVENOUS ONCE
Status: DISCONTINUED | OUTPATIENT
Start: 2021-03-22 | End: 2021-03-22 | Stop reason: HOSPADM

## 2021-03-22 RX ORDER — ROCURONIUM BROMIDE 10 MG/ML
INJECTION, SOLUTION INTRAVENOUS PRN
Status: DISCONTINUED | OUTPATIENT
Start: 2021-03-22 | End: 2021-03-22 | Stop reason: SDUPTHER

## 2021-03-22 RX ORDER — LIDOCAINE HYDROCHLORIDE 20 MG/ML
INJECTION, SOLUTION INFILTRATION; PERINEURAL PRN
Status: DISCONTINUED | OUTPATIENT
Start: 2021-03-22 | End: 2021-03-22 | Stop reason: SDUPTHER

## 2021-03-22 RX ORDER — KETOROLAC TROMETHAMINE 30 MG/ML
INJECTION, SOLUTION INTRAMUSCULAR; INTRAVENOUS PRN
Status: DISCONTINUED | OUTPATIENT
Start: 2021-03-22 | End: 2021-03-22 | Stop reason: SDUPTHER

## 2021-03-22 RX ORDER — MEPERIDINE HYDROCHLORIDE 25 MG/ML
12.5 INJECTION INTRAMUSCULAR; INTRAVENOUS; SUBCUTANEOUS EVERY 5 MIN PRN
Status: DISCONTINUED | OUTPATIENT
Start: 2021-03-22 | End: 2021-03-22 | Stop reason: HOSPADM

## 2021-03-22 RX ORDER — ASPIRIN 81 MG/1
81 TABLET ORAL DAILY
Qty: 30 TABLET | Refills: 0 | COMMUNITY
Start: 2021-03-22 | End: 2022-04-21

## 2021-03-22 RX ORDER — ONDANSETRON 2 MG/ML
8 INJECTION INTRAMUSCULAR; INTRAVENOUS EVERY 8 HOURS PRN
Status: DISCONTINUED | OUTPATIENT
Start: 2021-03-22 | End: 2021-03-22 | Stop reason: HOSPADM

## 2021-03-22 RX ORDER — MORPHINE SULFATE 2 MG/ML
2 INJECTION, SOLUTION INTRAMUSCULAR; INTRAVENOUS
Status: DISCONTINUED | OUTPATIENT
Start: 2021-03-22 | End: 2021-03-22 | Stop reason: HOSPADM

## 2021-03-22 RX ORDER — MORPHINE SULFATE 2 MG/ML
4 INJECTION, SOLUTION INTRAMUSCULAR; INTRAVENOUS
Status: DISCONTINUED | OUTPATIENT
Start: 2021-03-22 | End: 2021-03-22 | Stop reason: HOSPADM

## 2021-03-22 RX ADMIN — SODIUM CHLORIDE, POTASSIUM CHLORIDE, SODIUM LACTATE AND CALCIUM CHLORIDE: 600; 310; 30; 20 INJECTION, SOLUTION INTRAVENOUS at 12:23

## 2021-03-22 RX ADMIN — Medication 0.8 MG: at 13:40

## 2021-03-22 RX ADMIN — HYDROMORPHONE HYDROCHLORIDE 1 MG: 2 INJECTION INTRAMUSCULAR; INTRAVENOUS; SUBCUTANEOUS at 13:49

## 2021-03-22 RX ADMIN — ONDANSETRON 4 MG: 2 INJECTION INTRAMUSCULAR; INTRAVENOUS at 14:54

## 2021-03-22 RX ADMIN — ONDANSETRON HYDROCHLORIDE 4 MG: 4 INJECTION, SOLUTION INTRAMUSCULAR; INTRAVENOUS at 13:40

## 2021-03-22 RX ADMIN — LIDOCAINE HYDROCHLORIDE 100 MG: 20 INJECTION, SOLUTION INFILTRATION; PERINEURAL at 12:19

## 2021-03-22 RX ADMIN — DEXAMETHASONE SODIUM PHOSPHATE 10 MG: 4 INJECTION, SOLUTION INTRAMUSCULAR; INTRAVENOUS at 12:34

## 2021-03-22 RX ADMIN — ROCURONIUM BROMIDE 15 MG: 10 INJECTION INTRAVENOUS at 13:13

## 2021-03-22 RX ADMIN — CEFAZOLIN SODIUM 2 G: 10 INJECTION, POWDER, FOR SOLUTION INTRAVENOUS at 12:28

## 2021-03-22 RX ADMIN — HYDROMORPHONE HYDROCHLORIDE 1 MG: 2 INJECTION INTRAMUSCULAR; INTRAVENOUS; SUBCUTANEOUS at 12:43

## 2021-03-22 RX ADMIN — SODIUM CHLORIDE, POTASSIUM CHLORIDE, SODIUM LACTATE AND CALCIUM CHLORIDE: 600; 310; 30; 20 INJECTION, SOLUTION INTRAVENOUS at 10:19

## 2021-03-22 RX ADMIN — MIDAZOLAM HYDROCHLORIDE 2 MG: 1 INJECTION, SOLUTION INTRAMUSCULAR; INTRAVENOUS at 12:13

## 2021-03-22 RX ADMIN — PROPOFOL 200 MG: 10 INJECTION, EMULSION INTRAVENOUS at 12:19

## 2021-03-22 RX ADMIN — SODIUM CHLORIDE, POTASSIUM CHLORIDE, SODIUM LACTATE AND CALCIUM CHLORIDE: 600; 310; 30; 20 INJECTION, SOLUTION INTRAVENOUS at 12:13

## 2021-03-22 RX ADMIN — Medication 4 MG: at 13:40

## 2021-03-22 RX ADMIN — KETOROLAC TROMETHAMINE 30 MG: 30 INJECTION, SOLUTION INTRAMUSCULAR at 13:48

## 2021-03-22 RX ADMIN — ROCURONIUM BROMIDE 50 MG: 10 INJECTION INTRAVENOUS at 12:19

## 2021-03-22 RX ADMIN — FENTANYL CITRATE 100 MCG: 50 INJECTION, SOLUTION INTRAMUSCULAR; INTRAVENOUS at 12:19

## 2021-03-22 ASSESSMENT — PULMONARY FUNCTION TESTS
PIF_VALUE: 1
PIF_VALUE: 27
PIF_VALUE: 22
PIF_VALUE: 27
PIF_VALUE: 1
PIF_VALUE: 26
PIF_VALUE: 17
PIF_VALUE: 15
PIF_VALUE: 27
PIF_VALUE: 1
PIF_VALUE: 6
PIF_VALUE: 26
PIF_VALUE: 1
PIF_VALUE: 26
PIF_VALUE: 25
PIF_VALUE: 1
PIF_VALUE: 26
PIF_VALUE: 15
PIF_VALUE: 14
PIF_VALUE: 26
PIF_VALUE: 14
PIF_VALUE: 25
PIF_VALUE: 0
PIF_VALUE: 17
PIF_VALUE: 27
PIF_VALUE: 26
PIF_VALUE: 13
PIF_VALUE: 25
PIF_VALUE: 26
PIF_VALUE: 24
PIF_VALUE: 26
PIF_VALUE: 26
PIF_VALUE: 19
PIF_VALUE: 0
PIF_VALUE: 13
PIF_VALUE: 26
PIF_VALUE: 14
PIF_VALUE: 26
PIF_VALUE: 22
PIF_VALUE: 17
PIF_VALUE: 22
PIF_VALUE: 16
PIF_VALUE: 12
PIF_VALUE: 24
PIF_VALUE: 17
PIF_VALUE: 26
PIF_VALUE: 25
PIF_VALUE: 26

## 2021-03-22 ASSESSMENT — LIFESTYLE VARIABLES: SMOKING_STATUS: 1

## 2021-03-22 ASSESSMENT — PAIN SCALES - GENERAL: PAINLEVEL_OUTOF10: 0

## 2021-03-22 NOTE — OP NOTE
Carlos Eduardo Hernández 60                                    [place], [place]                                   RECORD OF OPERATION       PREOPERATIVE DIAGNOSES: Pelvic pain, Dysmenorrhea, Endometriosis     POSTOPERATIVE DIAGNOSES:  Pelvic pain, Dysmenorrhea, Endometriosis, Pelvic adhesions     PROCEDURE:     Robotic assisted TLH with BS and POLINA     SURGEON:  Dr. Ibeth Kerr. ANESTHESIA:  General.     ESTIMATED BLOOD LOSS:  50 ml. COMPLICATIONS:  None. SPECIMENS:  Uterus, Cervix, and bilateral tubes     FINDINGS:   Enlarged uterus, normal tubes and ovaries, adhesions between bladder and uterus, bowel to left pelvic sidewall, omentum to uterine fundus, and ovaries to pelvic sidewalls     PROCEDURE:  After signing informed consent, the patient was taken to the Operating Room and placed in a supine position and given general anesthesia. The patient was then placed in a dorsolithotomy position and prepped and draped in the normal fashion. A guerra was placed in the bladder to drain urine. Two Rosen retractors were used in the vagina to identify the cervix. The anterior lip of the cervix was grasped with a single-tooth tenaculum. IUD was removed with ringed forceps. A  medium V-care uterine manipulator was placed in the endocervical canal for manipulation of the uterus. All other instruments were removed from the vagina. Attention was placed to the abdomen. An 8 mm incision was made in the umbilicus in the LUQ. An 8 mm Airseal trocar was placed under direct visualization. The abdomen was inflated with CO2 gas. Bilateral lower quadrant 8 mm ports were placed under direct visualization. An 8 mm port was placed on the inferior portion of the umbilicus with care to avoid adhesions. The patient was placed in steep trendelenburg. The robot was docked and the console portion was started. Adhesions between the uterus and omentum were taken down with sharp dissection.   Bilateral uteroovarian and round ligaments were identified, cauterized, and transected with electrocautery. The tubes were removed with electrocautery. The adhesions between the bowels and the left pelvic sidewall were reduced with sharp dissection. The anterior and posterior peritoneum were , the uterine arteries were skeletonized, and the vesicouterine peritoneum was disected off the vagina. The uterine arteries were then cauterized and transected bilaterally with electrocautery. The uterus and cervix were then amputated off the vagina in a circumferential fashion using the Vcare as a guide. The uterus and cervix were then delivered thorough the vagina. The pelvis was irrigated and the vaginal cuff was closed with 0-vicryl suture 3 across. Good hemostasis was noted. Bilateral ureters were identified and had good peristalsis. The robot was undocked and all instrument were removed from the abdomen. All skin incisions were closed with 4-0 Vicryl and Steri-Strips. Counts were correct x2. The patient received antibiotics prior to skin incision. The patient was transferred to Recovery in stable condition. Debbie Das.

## 2021-03-22 NOTE — PROGRESS NOTES
1349 To recovery via cart. Spont resp. VSS. IV infusing KVO. Pt asleep. Warming device applied. SCDs applied bilat. Ice applied to lower abd. Report received from surgical rn and CRNA. Steri strips intact and slight red drainage from four sites on abd.   1355 Restful. 1400 Remains restful and stable. 1405 Condition unchanged  1410 Awake and oriented. Denies pain or nausea. 1415 Condition unchanged. Denies needs  1420 Remains restful  1425 Rates abd pain 3/10 and denies need for pain med  1430 Meets criteria for transfer to phase II recovery care. Transfer to phase II recovery care via cart. Fiance at bedside. Call bell in reach. Denies needs. Ice remains to abd. Warming device applied  1440 States feeling nauseated  1455 4 mg Zofran IV for c/o nausea  1505 States still slightly nauseated. Water and crackers given per pt request  1520 Muffin given. 1555 Jello PO  1610 Up to void. 1620 Return to bed. Unable to void. Water given po.  1645 IV discontinued  1650 Up to void qs  1700 Up to dress self  1715 Discharge instructions given to pt and fiance with each voicing understanding. Pt nauseated. Pop given  9715 [id number] States feeling better.  Discharge to home in stable condition per wheel chair with fiance

## 2021-03-22 NOTE — ANESTHESIA POSTPROCEDURE EVALUATION
Department of Anesthesiology  Postprocedure Note    Patient: Tg Hayes  MRN: [id number]  YOB: 1985  Date of evaluation: 3/22/2021  Time:  4:17 PM     Procedure Summary     Date: 03/22/21 Room / Location: [address] [place] [address] 05 / 138 [facility]    Anesthesia Start: 4668 Anesthesia Stop: 3568    Procedure: HYSTERECTOMY ABDOMINAL LAPAROSCOPIC ROBOTIC, WITH BILATERAL SALPINGECTOMY (N/A Uterus) Diagnosis: (IRREG MENSES, PELVIC PAIN, ENDOMETRIOSIS)    Surgeons: Jessica Whittington DO Responsible Provider: Concha Nath MD    Anesthesia Type: general ASA Status: 2          Anesthesia Type: general    Krystal Phase I: Krystal Score: 10    Krystal Phase II: Krystal Score: 10    Last vitals: Reviewed and per EMR flowsheets.        Anesthesia Post Evaluation    Patient location during evaluation: PACU  Patient participation: complete - patient participated  Level of consciousness: awake  Nausea & Vomiting: no nausea  Complications: no  Cardiovascular status: hemodynamically stable  Respiratory status: spontaneous ventilation

## 2021-03-22 NOTE — ANESTHESIA PRE PROCEDURE
Department of Anesthesiology  Preprocedure Note       Name:  Maycol Beckford   Age:  28 y.o.  :  1985                                          MRN:  [id number]         Date:  3/22/2021      Surgeon: Elyssa Handler):  Dmitriy Russell DO    Procedure: Procedure(s): HYSTERECTOMY ABDOMINAL LAPAROSCOPIC ROBOTIC    Medications prior to admission:   Prior to Admission medications    Medication Sig Start Date End Date Taking? Authorizing Provider   acetaminophen (TYLENOL) 500 MG tablet Take 500 mg by mouth every 6 hours as needed for Pain   Yes Historical Provider, MD   sertraline (ZOLOFT) 100 MG tablet take 1 and 1/2 tablets by mouth at bedtime 20   Historical Provider, MD       Current medications:    Current Facility-Administered Medications   Medication Dose Route Frequency Provider Last Rate Last Admin    lactated ringers infusion   Intravenous Continuous Dmitriy Russell  mL/hr at 21 1019 New Bag at 21 1019    ondansetron (ZOFRAN) injection 8 mg  8 mg Intravenous Q8H PRN Dmitriy Russell DO        ketorolac (TORADOL) injection 30 mg  30 mg Intravenous Once Dmitriy Russell DO        ceFAZolin (ANCEF) 2000 mg in dextrose 5 % 50 mL IVPB  2,000 mg Intravenous On Call to [address], DO           Allergies:     Allergies   Allergen Reactions    Tape [place] Tape] Other (See Comments)     Redness with plastic tape       Problem List:    Patient Active Problem List   Diagnosis Code    Routine general medical examination at a health care facility Z00.00    Orthostatic lightheadedness R42    Depression with anxiety F41.8    Postpartum depression O99.345, F53.0    Abdominal pain R10.9       Past Medical History:        Diagnosis Date    Anxiety     Blood clotting disorder ([address]     Depression     [facility] S [facility]     only with pregnancy    Hx of blood clots     Laceration 12    left wrist    Unspecified diseases of blood and blood-forming organs 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       Past Surgical History:        Procedure Laterality Date    BACK SURGERY      BACK SURGERY      BACK SURGERY  2013     SECTION  2013     SECTION  2019    TONSILLECTOMY      UTERINE SUSPENSION      WISDOM TOOTH EXTRACTION         Social History:    Social History     Tobacco Use    Smoking status: Current Every Day Smoker     Packs/day: 0.50     Years: 12.00     Pack years: 6.00     Types: Cigarettes    Smokeless tobacco: Never Used   Substance Use Topics    Alcohol use: No                                Ready to quit: Not Answered  Counseling given: Not Answered      Vital Signs (Current):   Vitals:    03/15/21 1324 21 1009   BP:  135/72   Pulse:  88   Resp:  17   Temp:  97.1 °F (36.2 °C)   TempSrc:  Temporal   SpO2:  99%   Weight: 167 lb (75.8 kg) 170 lb 12.8 oz (77.5 kg)   Height: 5' 9\" (1.753 m) 5' 9\" (1.753 m)                                              BP Readings from Last 3 Encounters:   21 135/72   20 118/80   19 121/79       NPO Status: Time of last liquid consumption:                         Time of last solid consumption:                         Date of last liquid consumption: 21                        Date of last solid food consumption: 21    BMI:   Wt Readings from Last 3 Encounters:   21 170 lb 12.8 oz (77.5 kg)   20 207 lb (93.9 kg)   19 234 lb (106.1 kg)     Body mass index is 25.22 kg/m².     CBC:   Lab Results   Component Value Date    WBC 7.8 2019    RBC 4.16 2019    RBC 3.90 2011    HGB 12.7 2019    HCT 38.3 2019    MCV 92.1 2019    RDW 13.4 2016     2019       CMP:   Lab Results   Component Value Date     2016    K 3.5 2016     2016    CO2 29 2016    BUN 10 2016    CREATININE 0.7 2016    LABGLOM >90 2016    GLUCOSE 92 2016    PROT 7.2 2015 CALCIUM 9.3 08/25/2016    BILITOT 0.2 11/23/2015    ALKPHOS 62 11/23/2015    AST 14 11/23/2015    ALT 18 11/23/2015       POC Tests: No results for input(s): POCGLU, POCNA, POCK, POCCL, POCBUN, POCHEMO, POCHCT in the last 72 hours. Coags:   Lab Results   Component Value Date    INR 0.88 12/16/2013    APTT 31.2 12/16/2013       HCG (If Applicable):   Lab Results   Component Value Date    PREGTESTUR negative 03/22/2021    PREGSERUM NEGATIVE 11/23/2015        ABGs: No results found for: PHART, PO2ART, RYV0TMD, XEK7XPC, BEART, C7TOCGGX     Type & Screen (If Applicable):  Lab Results   Component Value Date    LABRH POS 04/26/2019       Drug/Infectious Status (If Applicable):  No results found for: HIV, HEPCAB    COVID-19 Screening (If Applicable):   Lab Results   Component Value Date    COVID19 Not Detected 03/15/2021           Anesthesia Evaluation  Patient summary reviewed and Nursing notes reviewed no history of anesthetic complications:   Airway: Mallampati: II  TM distance: >3 FB   Neck ROM: full  Mouth opening: > = 3 FB Dental:          Pulmonary:normal exam  breath sounds clear to auscultation  (+) current smoker          Patient did not smoke on day of surgery. Cardiovascular:Negative CV ROS  Exercise tolerance: good (>4 METS),         ECG reviewed                        Neuro/Psych:   (+) psychiatric history:            GI/Hepatic/Renal: Neg GI/Hepatic/Renal ROS            Endo/Other: Negative Endo/Other ROS             Pt had no PAT visit       Abdominal:           Vascular:   + DVT (h/o), . Anesthesia Plan      general     ASA 2       Induction: intravenous. MIPS: Postoperative opioids intended and Prophylactic antiemetics administered. Anesthetic plan and risks discussed with patient and spouse.                       [address], DO   3/22/2021

## 2021-03-22 NOTE — DISCHARGE SUMMARY
Gynecology Discharge Summary        Pt Name: Jacqueline Jones  MRN: [id number] Sarah #: [de-identified]  YOB: 1985      Reasons for Admission on 3/22/2021  9:54 AM  IRREG MENSES, PELVIC PAIN, ENDOMETRIOSIS  No comment available      PROCEDURE:  Robotic TLH with BS    Operative Complications   none    Pre Operative Labs    Lab Results   Component Value Date    WBC 7.8 09/08/2019    HGB 12.7 09/08/2019    HCT 38.3 09/08/2019     09/08/2019       Discharge Diagnosis   S/P Robotic TLH with BS    Discharge Information  Current Discharge Medication List      START taking these medications    Details   oxyCODONE-acetaminophen (PERCOCET) 5-325 MG per tablet Take 1 tablet by mouth every 6 hours as needed for Pain for up to 7 days. Intended supply: 3 days.  Take lowest dose possible to manage pain  Qty: 15 tablet, Refills: 0    Comments: Reduce doses taken as pain becomes manageable  Associated Diagnoses: Post-op pain      aspirin EC 81 MG EC tablet Take 1 tablet by mouth daily  Qty: 30 tablet, Refills: 0         CONTINUE these medications which have NOT CHANGED    Details   acetaminophen (TYLENOL) 500 MG tablet Take 500 mg by mouth every 6 hours as needed for Pain      sertraline (ZOLOFT) 100 MG tablet take 1 and 1/2 tablets by mouth at bedtime               Diet regular  Condition: Stable  Discharge to:  home  Follow up in 1-2 wks    Patient to be discharged on 3/22/2021

## 2021-03-22 NOTE — INTERVAL H&P NOTE
[address] [address]  History and Physical Update    Pt Name: Trent Hdez  MRN: [id number]  YOB: 1985  Date of evaluation: 3/22/2021    [x] I have examined the patient and reviewed the H&P/Consult and there are no changes to the patient or plans.     [] I have examined the patient and reviewed the H&P/Consult and have noted the following changes:        Maria Fernanda Boyce  Electronically signed 3/22/2021 at 12:12 PM

## 2022-02-19 ENCOUNTER — APPOINTMENT (OUTPATIENT)
Dept: GENERAL RADIOLOGY | Age: 37
End: 2022-02-19
Payer: MEDICARE

## 2022-02-19 ENCOUNTER — HOSPITAL ENCOUNTER (EMERGENCY)
Age: 37
Discharge: HOME | End: 2022-02-19
Attending: EMERGENCY MEDICINE
Payer: MEDICARE

## 2022-02-19 VITALS
TEMPERATURE: 96.9 F | BODY MASS INDEX: 25.18 KG/M2 | SYSTOLIC BLOOD PRESSURE: 139 MMHG | WEIGHT: 170 LBS | OXYGEN SATURATION: 99 % | RESPIRATION RATE: 18 BRPM | HEART RATE: 100 BPM | DIASTOLIC BLOOD PRESSURE: 78 MMHG | HEIGHT: 69 IN

## 2022-02-19 DIAGNOSIS — S63.616A SPRAIN OF RIGHT LITTLE FINGER, UNSPECIFIED SITE OF DIGIT, INITIAL ENCOUNTER: ICD-10-CM

## 2022-02-19 DIAGNOSIS — M79.644 PAIN IN FINGER OF RIGHT HAND: Primary | ICD-10-CM

## 2022-02-19 PROCEDURE — L3809 WHFO W/O JOINTS PRE OTS: HCPCS

## 2022-02-19 PROCEDURE — 99283 EMERGENCY DEPT VISIT LOW MDM: CPT

## 2022-02-19 PROCEDURE — 73140 X-RAY EXAM OF FINGER(S): CPT

## 2022-02-19 ASSESSMENT — PAIN DESCRIPTION - ORIENTATION: ORIENTATION: RIGHT

## 2022-02-19 ASSESSMENT — PAIN DESCRIPTION - LOCATION: LOCATION: FINGER (COMMENT WHICH ONE)

## 2022-02-19 ASSESSMENT — PAIN SCALES - GENERAL: PAINLEVEL_OUTOF10: 8

## 2022-02-19 ASSESSMENT — PAIN - FUNCTIONAL ASSESSMENT: PAIN_FUNCTIONAL_ASSESSMENT: 0-10

## 2022-02-19 ASSESSMENT — PAIN DESCRIPTION - PAIN TYPE: TYPE: ACUTE PAIN

## 2022-02-20 NOTE — ED PROVIDER NOTES
[address]  [address] [address] [address]  Phone: [phone]    eMERGENCY dEPARTMENT eNCOUnter           [facility]       Chief Complaint   Patient presents with    Finger Injury     Pt c/o rt hand 5th digit injury around DIP joint, cooking and hit hand on counter, no obvious deformity noted       Nurses Notes reviewed and I agree except as noted in the HPI. HISTORY OF PRESENT ILLNESS    Hunter Duvall is a 39 y.o. female who presented via private vehicle with above-mentioned complaint. She injured her right fifth finger while cooking at home 2 hours ago. She is complaining of moderate, sharp, throbbing persistent pain of the DIP joint of her right fifth finger. Her pain is worse with movement. REVIEW OF SYSTEMS     None except as mentioned above. PAST MEDICAL HISTORY    has a past medical history of Anxiety, Blood clotting disorder ([address] Depression, Heterozygous hemoglobin S ([facility] Hx of blood clots, Laceration, and Unspecified diseases of blood and blood-forming organs. SURGICAL HISTORY      has a past surgical history that includes [place] tooth extraction; Uterine Suspension;  section (2013); Tonsillectomy; back surgery (); back surgery (); back surgery ();  section (); and Hysterectomy (N/A, 3/22/2021). CURRENT MEDICATIONS       Discharge Medication List as of 2022  9:14 PM      CONTINUE these medications which have NOT CHANGED    Details   aspirin EC 81 MG EC tablet Take 1 tablet by mouth daily, Disp-30 tablet, R-0OTC      sertraline (ZOLOFT) 100 MG tablet take 1 and 1/2 tablets by mouth at bedtimeHistorical Med      acetaminophen (TYLENOL) 500 MG tablet Take 500 mg by mouth every 6 hours as needed for PainHistorical Med             ALLERGIES     is allergic to tape [adhesive tape]. FAMILY HISTORY     She indicated that her mother is alive. She indicated that her father is alive.  She indicated that her brother is alive. She indicated that the status of her maternal grandmother is unknown. She indicated that the status of her maternal grandfather is unknown.   family history includes Heart Disease in her maternal grandfather; High Blood Pressure in her maternal grandmother; Other in her maternal grandmother. SOCIAL HISTORY      reports that she has been smoking cigarettes. She has a 6.00 pack-year smoking history. She has never used smokeless tobacco. She reports that she does not drink alcohol and does not use drugs. PHYSICAL EXAM     INITIAL VITALS:  height is 5' 9\" (1.753 m) and weight is 170 lb (77.1 kg). Her temporal temperature is 96.9 °F (36.1 °C). Her blood pressure is 139/78 and her pulse is 100. Her respiration is 18 and oxygen saturation is 99%. Physical Exam  Constitutional:       General: She is not in acute distress. Musculoskeletal:      Comments: Examination of the right hand showed mild swelling, ecchymosis and tenderness of the dorsal aspect of the DIP joint of the right fifth finger. She is able to flex and extend all phalanxes of the injured finger. Neurological:      Mental Status: She is alert. DIFFERENTIAL DIAGNOSIS:       DIAGNOSTIC RESULTS       RADIOLOGY:   Right fifth finger x-ray showed no fracture or dislocation  LABS:   Labs Reviewed - No data to display    EMERGENCY DEPARTMENT COURSE:   Vitals:    Vitals:    02/19/22 2032 02/19/22 2119   BP: 139/78    Pulse: 100    Resp: 16 18   Temp: 96.9 °F (36.1 °C)    TempSrc: Temporal    SpO2: 99%    Weight: 170 lb (77.1 kg)    Height: 5' 9\" (1.753 m)      Patient declined pain meds. She received finger splint, I discussed diagnosis and treatment plan with her. FINAL IMPRESSION      1. Pain in finger of right hand    2. Sprain of right little finger, unspecified site of digit, initial encounter          DISPOSITION/PLAN   Discharged home in good condition.     PATIENT REFERRED TO:    Follow-up with your family doctor or call [phone] if you do not have one          DISCHARGE MEDICATIONS:  Discharge Medication List as of 2/19/2022  9:14 PM          (Please note that portions of this note were completed with a voice recognition program.  Efforts were made to edit the dictations but occasionally words are mis-transcribed.)    MD Krystina Ayoub MD  02/19/22 [id number]

## 2022-04-13 ENCOUNTER — HOSPITAL ENCOUNTER (OUTPATIENT)
Dept: PHYSICAL THERAPY | Age: 37
Setting detail: THERAPIES SERIES
Discharge: HOME | End: 2022-04-13
Payer: MEDICARE

## 2022-04-13 PROCEDURE — 97110 THERAPEUTIC EXERCISES: CPT

## 2022-04-13 PROCEDURE — 97162 PT EVAL MOD COMPLEX 30 MIN: CPT
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
## 2022-04-21 ENCOUNTER — HOSPITAL ENCOUNTER (EMERGENCY)
Age: 37
Discharge: HOME | End: 2022-04-21
Attending: FAMILY MEDICINE
Payer: MEDICARE

## 2022-04-21 VITALS
SYSTOLIC BLOOD PRESSURE: 137 MMHG | HEART RATE: 87 BPM | HEIGHT: 63 IN | RESPIRATION RATE: 18 BRPM | OXYGEN SATURATION: 98 % | TEMPERATURE: 97.1 F | BODY MASS INDEX: 31.01 KG/M2 | WEIGHT: 175 LBS | DIASTOLIC BLOOD PRESSURE: 88 MMHG

## 2022-04-21 DIAGNOSIS — B37.9 ANTIBIOTIC-INDUCED YEAST INFECTION: ICD-10-CM

## 2022-04-21 DIAGNOSIS — T36.95XA ANTIBIOTIC-INDUCED YEAST INFECTION: ICD-10-CM

## 2022-04-21 DIAGNOSIS — J02.0 ACUTE STREPTOCOCCAL PHARYNGITIS: Primary | ICD-10-CM

## 2022-04-21 LAB
FLU A ANTIGEN: NEGATIVE
FLU B ANTIGEN: NEGATIVE
GROUP A STREP CULTURE, REFLEX: POSITIVE
REFLEX THROAT C + S: NORMAL

## 2022-04-21 PROCEDURE — 99283 EMERGENCY DEPT VISIT LOW MDM: CPT

## 2022-04-21 PROCEDURE — 6370000000 HC RX 637 (ALT 250 FOR IP): Performed by: FAMILY MEDICINE

## 2022-04-21 PROCEDURE — 87880 STREP A ASSAY W/OPTIC: CPT

## 2022-04-21 PROCEDURE — 87804 INFLUENZA ASSAY W/OPTIC: CPT

## 2022-04-21 RX ORDER — IBUPROFEN 600 MG/1
600 TABLET ORAL EVERY 6 HOURS PRN
Status: ON HOLD | COMMUNITY
End: 2022-09-06 | Stop reason: HOSPADM

## 2022-04-21 RX ORDER — AMOXICILLIN 500 MG/1
500 CAPSULE ORAL 2 TIMES DAILY
Qty: 14 CAPSULE | Refills: 0 | Status: SHIPPED | OUTPATIENT
Start: 2022-04-21 | End: 2022-04-28

## 2022-04-21 RX ORDER — FLUCONAZOLE 150 MG/1
150 TABLET ORAL
Qty: 2 TABLET | Refills: 0 | Status: SHIPPED | OUTPATIENT
Start: 2022-04-21 | End: 2022-04-27

## 2022-04-21 RX ORDER — AMOXICILLIN 250 MG/1
1000 CAPSULE ORAL ONCE
Status: COMPLETED | OUTPATIENT
Start: 2022-04-21 | End: 2022-04-21

## 2022-04-21 RX ADMIN — AMOXICILLIN 1000 MG: 250 CAPSULE ORAL at 22:23

## 2022-04-21 ASSESSMENT — ENCOUNTER SYMPTOMS
BACK PAIN: 0
ABDOMINAL PAIN: 0
SHORTNESS OF BREATH: 0
RHINORRHEA: 1
COUGH: 1
EYE DISCHARGE: 0
EYE REDNESS: 0
VOMITING: 0
DIARRHEA: 0
NAUSEA: 0
WHEEZING: 0
SORE THROAT: 1

## 2022-04-21 ASSESSMENT — PAIN SCALES - GENERAL: PAINLEVEL_OUTOF10: 10

## 2022-04-21 ASSESSMENT — PAIN DESCRIPTION - LOCATION: LOCATION: THROAT

## 2022-04-22 NOTE — ED PROVIDER NOTES
[address] [facility]          CHIEF COMPLAINT       Chief Complaint   Patient presents with    Pharyngitis    Cough       Nurses Notes reviewed and I agree except as noted in the HPI. HISTORY OF PRESENT ILLNESS    Marissa Sotelo is a 39 y.o. female who presents for evaluation of sore throat, postnasal drip, mild cough, and clear nasal discharge. Patient has had symptoms for the past couple of weeks but her sore throat worsened acutely over the past day. Rates her pain a 10/10 in severity. She has had a tonsillectomy performed in the past.  She has not had any fevers or chills. She has noted that her lymph nodes in her neck have been swollen and tender. Her daughter has had similar symptoms and was evaluated by her PCP and they felt that her symptoms were related to allergies. REVIEW OF SYSTEMS     Review of Systems   Constitutional: Negative for activity change, appetite change, chills and fever. HENT: Positive for postnasal drip, rhinorrhea and sore throat. Negative for ear pain. Eyes: Negative for discharge and redness. Respiratory: Positive for cough. Negative for shortness of breath and wheezing. Cardiovascular: Negative for chest pain and leg swelling. Gastrointestinal: Negative for abdominal pain, diarrhea, nausea and vomiting. Genitourinary: Negative for dysuria, flank pain and hematuria. Musculoskeletal: Negative for arthralgias, back pain, gait problem and neck pain. Skin: Negative for rash and wound. Neurological: Negative for weakness, light-headedness and headaches. Hematological: Positive for adenopathy. Psychiatric/Behavioral: Negative for agitation and hallucinations. The patient is not nervous/anxious.         PAST MEDICAL HISTORY    has a past medical history of Anxiety, Blood clotting disorder ([address] Depression, Heterozygous hemoglobin S ([facility] [address] Hx of blood clots, Laceration, and Unspecified diseases of blood and blood-forming organs. SURGICAL HISTORY      has a past surgical history that includes Gray tooth extraction; Uterine Suspension;  section (2013); Tonsillectomy; back surgery (); back surgery (); back surgery ();  section (); and Hysterectomy (N/A, 3/22/2021). CURRENT MEDICATIONS       Discharge Medication List as of 2022 10:26 PM      CONTINUE these medications which have NOT CHANGED    Details   ibuprofen (ADVIL;MOTRIN) 600 MG tablet Take 600 mg by mouth every 6 hours as needed for PainHistorical Med      acetaminophen (TYLENOL) 500 MG tablet Take 500 mg by mouth every 6 hours as needed for PainHistorical Med             ALLERGIES     is allergic to tape [adhesive tape]. FAMILY HISTORY     She indicated that her mother is alive. She indicated that her father is alive. She indicated that her brother is alive. She indicated that the status of her maternal grandmother is unknown. She indicated that the status of her maternal grandfather is unknown.   family history includes Heart Disease in her maternal grandfather; High Blood Pressure in her maternal grandmother; Other in her maternal grandmother. SOCIAL HISTORY      reports that she has been smoking cigarettes. She has a 6.00 pack-year smoking history. She has never used smokeless tobacco. She reports that she does not drink alcohol and does not use drugs. PHYSICAL EXAM     INITIAL VITALS:  height is 5' 3\" (1.6 m) and weight is 175 lb (79.4 kg). Her temperature is 97.1 °F (36.2 °C). Her blood pressure is 137/88 and her pulse is 87. Her respiration is 18 and oxygen saturation is 98%. Physical Exam  Vitals and nursing note reviewed. Constitutional:       General: She is not in acute distress. Appearance: She is well-developed. HENT:      Head: Normocephalic and atraumatic.       Right Ear: Tympanic membrane and ear canal normal.      Left Ear: Tympanic membrane and ear canal normal. Mouth/Throat:      Mouth: Mucous membranes are moist.      Pharynx: Posterior oropharyngeal erythema present. No oropharyngeal exudate. Eyes:      General:         Right eye: No discharge. Left eye: No discharge. Conjunctiva/sclera: Conjunctivae normal.   Neck:      Comments: No supraclavicular lymphadenopathy. Cardiovascular:      Rate and Rhythm: Normal rate and regular rhythm. Heart sounds: Normal heart sounds. Pulmonary:      Effort: Pulmonary effort is normal.      Breath sounds: Normal breath sounds. Abdominal:      General: Bowel sounds are normal. There is no distension. Palpations: Abdomen is soft. There is no mass. Tenderness: There is no abdominal tenderness. Musculoskeletal:      Cervical back: Normal range of motion. Lymphadenopathy:      Cervical: Cervical adenopathy present. Skin:     General: Skin is warm and dry. Neurological:      Mental Status: She is alert and oriented to person, place, and time. Psychiatric:         Mood and Affect: Mood normal.         Behavior: Behavior normal.         DIFFERENTIAL DIAGNOSIS:   Streptococcal pharyngitis, allergic rhinitis, influenza, COVID-19    DIAGNOSTIC RESULTS         LABS:   Labs Reviewed   RAPID INFLUENZA A/B ANTIGENS   GROUP A STREP, REFLEX       DEPARTMENT COURSE:   Vitals:    Vitals:    04/21/22 2139   BP: 137/88   Pulse: 87   Resp: 18   Temp: 97.1 °F (36.2 °C)   SpO2: 98%   Weight: 175 lb (79.4 kg)   Height: 5' 3\" (1.6 m)       MDM:  Patient presents for evaluation of rhinorrhea, postnasal drip, and sore throat. She does have positive for streptococcal pharyngitis. She is recommended to continue taking ibuprofen and to keep well-hydrated. She is also recommended to gargle for salt water. She is given dose of amoxicillin while in the department prescribed medication. Patient states that she has had problems of these infections when she takes antibiotics of Diflucan also prescribed.   Follow-up for symptom worsening or for evaluation of new concerning symptoms. Otherwise watchful waiting for symptom resolution. CRITICAL CARE:   None    CONSULTS:  None    PROCEDURES:  None    FINAL IMPRESSION      1. Acute streptococcal pharyngitis    2. Antibiotic-induced yeast infection          DISPOSITION/PLAN   Discharge    PATIENT REFERRED TO:  A PCP  [facility] [phone] to find a doctor near you.   Schedule an appointment as soon as possible for a visit in 1 week  As needed      DISCHARGEMEDICATIONS:  Discharge Medication List as of 4/21/2022 10:26 PM      START taking these medications    Details   amoxicillin (AMOXIL) 500 MG capsule Take 1 capsule by mouth 2 times daily for 7 days, Disp-14 capsule, R-0Normal      fluconazole (DIFLUCAN) 150 MG tablet Take 1 tablet by mouth every 72 hours for 6 days, Disp-2 tablet, R-0Normal             (Please note that portions of this note were completedwith a voice recognition program.  Efforts were made to edit the dictations but occasionally words are mis-transcribed.)    MD Karson Clarke MD  04/21/22 9313

## 2022-04-22 NOTE — ED NOTES
meds administered. Education complete. Discharge teaching and instructions for condition explained to patient. AVS reviewed. Went over prescriptions with patient. Patient voiced understanding regarding prescriptions, follow up appointments and care of self at home. Pt discharged to home in stable condition per self.  Pt given work slip       Reilly Fernandes RN  04/21/22 4803

## 2022-04-25 ENCOUNTER — HOSPITAL ENCOUNTER (OUTPATIENT)
Dept: PHYSICAL THERAPY | Age: 37
Setting detail: THERAPIES SERIES
Discharge: HOME | End: 2022-04-25
Payer: MEDICARE

## 2022-04-25 PROCEDURE — 97035 APP MDLTY 1+ULTRASOUND EA 15: CPT

## 2022-04-25 PROCEDURE — 97110 THERAPEUTIC EXERCISES: CPT

## 2022-04-25 NOTE — PROGRESS NOTES
** PLEASE SIGN, DATE AND TIME CERTIFICATION BELOW AND RETURN TO [facility] OUTPATIENT REHABILITATION (FAX #: [phone]). ATTEST/CO-SIGN IF ACCESSING VIA INBigpoint. THANK YOU.**    I certify that I have examined the patient below and determined that Physical Medicine and Rehabilitation service is necessary and that I approve the established plan of care for up to 90 days or as specifically noted. Attestation, signature or co-signature of physician indicates approval of certification requirements.    ________________________ ____________ __________  Physician Signature   Date   Time  [address] [facility]  PHYSICAL THERAPY  [] EVALUATION  [x] DAILY NOTE (LAND) [] DAILY NOTE (AQUATIC ) [] PROGRESS NOTE [] DISCHARGE NOTE    [] [facility]   [] [facility] 90    [x] [address] [facility]   [] April Lopez    Date: 2022  Patient Name:  Mi Briones  : 1985  MRN: [id number]  CSN: [id number]    Referring Practitioner Leny Mckeon MD   Diagnosis Radiculopathy, lumbar region [M54.16]    Treatment Diagnosis Lumbago with right radiculopathy   Date of Evaluation 22    Additional Pertinent History Anxiety  Disorder, Arthritis      Functional Outcome Measure Used Modified Oswestry   Functional Outcome Score 22 (22)       Insurance: Primary: Payor: Brooke Loo /  /  / ,   Secondary:    Authorization Information: Allowed 30 visits per calendar year. Aquatics and modalities except Ionto and HP/CP covered. Visit # 2, 2/10 for progress note   Visits Allowed: 30   Recertification Date: 46   Physician Follow-Up: 22   Physician Orders:    History of Present Illness: Has TENS unit at home     SUBJECTIVE: Patient reports no change in anything. States feels like something is just scraping across something on right side low back. States has to work a double shift today. Reports approval for MRI is still pending.     TREATMENT Precautions: None   Pain: 5/10 low back    X in shaded column indicates activity completed today   Modalities Parameters/  Location  Notes   US to right side low back in right sidelying 10 min, 1.0 MHz, 0.8 W/cm2, 50% pulsed X                Manual Therapy Time/Technique  Notes                     Exercise/Intervention   Notes   Supine DKTC 2x10 - first set aggravated right leg pain and second set no change    Educated to do hooklying unloading and DKTC every 2 hours or if has increase in symptoms and monitor pain. Stop if making things worse. Prone lying - no change for a few minutes then pain intensifies and propping makes it worse       Seated: abdominal bracing                Marching                LAQ                Adduction squeezes                Abduction 10x  10x  10x  10x  10x 5 sec      5 sec X  X  X  X  X                                                              Specific Interventions Next Treatment: modalities, manual therapy, posture education, strengthening     Activity/Treatment Tolerance:  [x]  Patient tolerated treatment well  []  Patient limited by fatigue  []  Patient limited by pain   []  Patient limited by medical complications  []  Other:     Assessment: Patient started some seated DLS today and PT added in US to see if helps with any inflammation in right side low back. Will see how feels after treatment. Waiting on approval for MRI. Will continue to treat as needed until sees what MRI shows.     GOALS:  Patient Goal: To not have any pain and build up strength    Short Term Goals:  Time Frame: 4 weeks  1) Patient to report 25% decrease in low back pain for ease with turning over in bed  2) Patient to report able to consistently decrease right leg symptoms to tolerate being on her feet longer for work duties  3) Patient to ambulate with more upright posture, even step length and more normal pattern for longer distances to walk for shopping  4) Patient to start and progress strength

## 2022-05-13 ENCOUNTER — HOSPITAL ENCOUNTER (OUTPATIENT)
Dept: MRI IMAGING | Age: 37
Discharge: HOME | End: 2022-05-13
Payer: MEDICARE

## 2022-05-13 DIAGNOSIS — M54.16 RADICULOPATHY, LUMBAR REGION: ICD-10-CM

## 2022-05-13 DIAGNOSIS — M54.50 LOW BACK PAIN, UNSPECIFIED BACK PAIN LATERALITY, UNSPECIFIED CHRONICITY, UNSPECIFIED WHETHER SCIATICA PRESENT: ICD-10-CM

## 2022-05-13 PROCEDURE — 72148 MRI LUMBAR SPINE W/O DYE: CPT

## 2022-07-05 ENCOUNTER — PROCEDURE VISIT (OUTPATIENT)
Dept: NEUROLOGY | Age: 37
End: 2022-07-05
Payer: MEDICARE

## 2022-07-05 DIAGNOSIS — M79.605 BILATERAL LEG PAIN: ICD-10-CM

## 2022-07-05 DIAGNOSIS — M79.604 BILATERAL LEG PAIN: ICD-10-CM

## 2022-07-05 DIAGNOSIS — M54.16 LUMBAR RADICULOPATHY: Primary | ICD-10-CM

## 2022-07-05 PROCEDURE — 95910 NRV CNDJ TEST 7-8 STUDIES: CPT | Performed by: PSYCHIATRY & NEUROLOGY

## 2022-07-05 PROCEDURE — 95886 MUSC TEST DONE W/N TEST COMP: CPT | Performed by: PSYCHIATRY & NEUROLOGY

## 2022-07-11 ENCOUNTER — TELEPHONE (OUTPATIENT)
Dept: NEUROLOGY | Age: 37
End: 2022-07-11

## 2022-07-11 NOTE — TELEPHONE ENCOUNTER
Patient called and stated that her DrCharisse Has not received the results of her EMG that was done 07/05/2022.  Please send to Dr. Tahira Granados at [facility]

## 2022-07-26 ENCOUNTER — OFFICE VISIT (OUTPATIENT)
Dept: FAMILY MEDICINE CLINIC | Age: 37
End: 2022-07-26
Payer: MEDICARE

## 2022-07-26 VITALS
DIASTOLIC BLOOD PRESSURE: 62 MMHG | HEIGHT: 69 IN | HEART RATE: 72 BPM | OXYGEN SATURATION: 98 % | WEIGHT: 180 LBS | SYSTOLIC BLOOD PRESSURE: 118 MMHG | BODY MASS INDEX: 26.66 KG/M2

## 2022-07-26 DIAGNOSIS — L03.116 CELLULITIS OF LEFT LOWER EXTREMITY: Primary | ICD-10-CM

## 2022-07-26 PROCEDURE — 99213 OFFICE O/P EST LOW 20 MIN: CPT | Performed by: FAMILY MEDICINE

## 2022-07-26 RX ORDER — CEPHALEXIN 500 MG/1
500 CAPSULE ORAL 2 TIMES DAILY
Qty: 14 CAPSULE | Refills: 0 | Status: SHIPPED | OUTPATIENT
Start: 2022-07-26 | End: 2022-08-02

## 2022-07-26 RX ORDER — BUSPIRONE HYDROCHLORIDE 5 MG/1
5 TABLET ORAL 2 TIMES DAILY
COMMUNITY
Start: 2022-07-07

## 2022-07-26 SDOH — ECONOMIC STABILITY: FOOD INSECURITY: WITHIN THE PAST 12 MONTHS, THE FOOD YOU BOUGHT JUST DIDN'T LAST AND YOU DIDN'T HAVE MONEY TO GET MORE.: NEVER TRUE

## 2022-07-26 SDOH — ECONOMIC STABILITY: FOOD INSECURITY: WITHIN THE PAST 12 MONTHS, YOU WORRIED THAT YOUR FOOD WOULD RUN OUT BEFORE YOU GOT MONEY TO BUY MORE.: NEVER TRUE

## 2022-07-26 ASSESSMENT — PATIENT HEALTH QUESTIONNAIRE - PHQ9
8. MOVING OR SPEAKING SO SLOWLY THAT OTHER PEOPLE COULD HAVE NOTICED. OR THE OPPOSITE, BEING SO FIGETY OR RESTLESS THAT YOU HAVE BEEN MOVING AROUND A LOT MORE THAN USUAL: 0
2. FEELING DOWN, DEPRESSED OR HOPELESS: 1
SUM OF ALL RESPONSES TO PHQ QUESTIONS 1-9: 7
7. TROUBLE CONCENTRATING ON THINGS, SUCH AS READING THE NEWSPAPER OR WATCHING TELEVISION: 1
6. FEELING BAD ABOUT YOURSELF - OR THAT YOU ARE A FAILURE OR HAVE LET YOURSELF OR YOUR FAMILY DOWN: 1
SUM OF ALL RESPONSES TO PHQ QUESTIONS 1-9: 7
3. TROUBLE FALLING OR STAYING ASLEEP: 1
9. THOUGHTS THAT YOU WOULD BE BETTER OFF DEAD, OR OF HURTING YOURSELF: 0
4. FEELING TIRED OR HAVING LITTLE ENERGY: 1
5. POOR APPETITE OR OVEREATING: 1
SUM OF ALL RESPONSES TO PHQ QUESTIONS 1-9: 7
10. IF YOU CHECKED OFF ANY PROBLEMS, HOW DIFFICULT HAVE THESE PROBLEMS MADE IT FOR YOU TO DO YOUR WORK, TAKE CARE OF THINGS AT HOME, OR GET ALONG WITH OTHER PEOPLE: 1
SUM OF ALL RESPONSES TO PHQ9 QUESTIONS 1 & 2: 2
1. LITTLE INTEREST OR PLEASURE IN DOING THINGS: 1
SUM OF ALL RESPONSES TO PHQ QUESTIONS 1-9: 7

## 2022-07-26 ASSESSMENT — ENCOUNTER SYMPTOMS
CONSTIPATION: 0
COUGH: 0
VOMITING: 0
TROUBLE SWALLOWING: 0
SORE THROAT: 0
ABDOMINAL PAIN: 0
SHORTNESS OF BREATH: 0
DIARRHEA: 0

## 2022-07-26 ASSESSMENT — SOCIAL DETERMINANTS OF HEALTH (SDOH): HOW HARD IS IT FOR YOU TO PAY FOR THE VERY BASICS LIKE FOOD, HOUSING, MEDICAL CARE, AND HEATING?: NOT HARD AT ALL

## 2022-07-26 NOTE — PROGRESS NOTES
Janel Villela is a 40 y. o.female      Pt complains of redness and swelling around L ankle after getting a tattoo approx 10 days ago. Pt has been applying anitbiotic oint and warm compresses. Review of Systems   Constitutional:  Negative for appetite change, fatigue, fever and unexpected weight change. HENT:  Negative for congestion, ear pain, sore throat and trouble swallowing. Eyes:  Negative for visual disturbance. Respiratory:  Negative for cough and shortness of breath. Cardiovascular:  Negative for chest pain, palpitations and leg swelling. Gastrointestinal:  Negative for abdominal pain, constipation, diarrhea and vomiting. Genitourinary:  Negative for dysuria and frequency. Musculoskeletal:  Negative for arthralgias and myalgias. Skin:  Negative for rash. Neurological:  Negative for dizziness. OBJECTIVE     /62 (Site: Left Upper Arm, Position: Sitting)   Pulse 72   Ht 5' 9\" (1.753 m)   Wt 180 lb (81.6 kg)   LMP  (LMP Unknown)   SpO2 98%   BMI 26.58 kg/m²     Physical Exam  Vitals and nursing note reviewed. Constitutional:       General: She is not in acute distress. Appearance: Normal appearance. She is normal weight. She is not ill-appearing. HENT:      Head: Normocephalic and atraumatic. Right Ear: External ear normal.      Left Ear: External ear normal.      Mouth/Throat:      Mouth: Mucous membranes are moist.   Eyes:      Pupils: Pupils are equal, round, and reactive to light. Cardiovascular:      Rate and Rhythm: Normal rate and regular rhythm. Pulses: Normal pulses. Heart sounds: No murmur heard. Musculoskeletal:         General: Swelling present. Skin:     General: Skin is warm. Findings: Rash (erythema and scant honey colored drainage from heart shaped tattoo medial L ankle) present. Neurological:      General: No focal deficit present. Mental Status: She is alert.    Psychiatric:         Mood and Affect: Mood normal.         No results found for this visit on 07/26/22. ASSESSMENT       Diagnosis Orders   1. Cellulitis of left lower extremity            PLAN     1.  Cellulitis of left lower extremity  Mild related to new tattoo, early impetiginous drainage noted- plan coverage with keflex, call if worsening redness/swelling or pain       Electronically signed by Farhana Price MD on 7/26/2022 at 2:22 PM

## 2022-08-12 ENCOUNTER — HOSPITAL ENCOUNTER (OUTPATIENT)
Dept: GENERAL RADIOLOGY | Age: 37
Discharge: HOME | End: 2022-08-12
Payer: MEDICARE

## 2022-08-12 ENCOUNTER — HOSPITAL ENCOUNTER (OUTPATIENT)
Age: 37
Discharge: HOME | End: 2022-08-12
Payer: MEDICARE

## 2022-08-12 DIAGNOSIS — M54.16 LUMBAR RADICULOPATHY: ICD-10-CM

## 2022-08-12 LAB
ALBUMIN SERPL-MCNC: 4.6 G/DL (ref 3.5–5.1)
ANION GAP SERPL CALCULATED.3IONS-SCNC: 11 MEQ/L (ref 8–16)
BASOPHILS # BLD: 0.7 %
BASOPHILS ABSOLUTE: 0.1 THOU/MM3 (ref 0–0.1)
BUN BLDV-MCNC: 13 MG/DL (ref 7–22)
CALCIUM SERPL-MCNC: 9.6 MG/DL (ref 8.5–10.5)
CHLORIDE BLD-SCNC: 101 MEQ/L (ref 98–111)
CO2: 29 MEQ/L (ref 23–33)
CREAT SERPL-MCNC: 0.6 MG/DL (ref 0.4–1.2)
EOSINOPHIL # BLD: 0.8 %
EOSINOPHILS ABSOLUTE: 0.1 THOU/MM3 (ref 0–0.4)
ERYTHROCYTE [DISTWIDTH] IN BLOOD BY AUTOMATED COUNT: 12.9 % (ref 11.5–14.5)
ERYTHROCYTE [DISTWIDTH] IN BLOOD BY AUTOMATED COUNT: 43.8 FL (ref 35–45)
GFR SERPL CREATININE-BSD FRML MDRD: > 90 ML/MIN/1.73M2
GLUCOSE BLD-MCNC: 95 MG/DL (ref 70–108)
HCT VFR BLD CALC: 44.5 % (ref 37–47)
HEMOGLOBIN: 14.6 GM/DL (ref 12–16)
IMMATURE GRANS (ABS): 0.06 THOU/MM3 (ref 0–0.07)
IMMATURE GRANULOCYTES: 0.6 %
LYMPHOCYTES # BLD: 39.3 %
LYMPHOCYTES ABSOLUTE: 4.1 THOU/MM3 (ref 1–4.8)
MCH RBC QN AUTO: 30.5 PG (ref 26–33)
MCHC RBC AUTO-ENTMCNC: 32.8 GM/DL (ref 32.2–35.5)
MCV RBC AUTO: 93.1 FL (ref 81–99)
MONOCYTES # BLD: 5.8 %
MONOCYTES ABSOLUTE: 0.6 THOU/MM3 (ref 0.4–1.3)
NUCLEATED RED BLOOD CELLS: 0 /100 WBC
PLATELET # BLD: 200 THOU/MM3 (ref 130–400)
PMV BLD AUTO: 11.2 FL (ref 9.4–12.4)
POTASSIUM SERPL-SCNC: 3.7 MEQ/L (ref 3.5–5.2)
RBC # BLD: 4.78 MILL/MM3 (ref 4.2–5.4)
SEG NEUTROPHILS: 52.8 %
SEGMENTED NEUTROPHILS ABSOLUTE COUNT: 5.5 THOU/MM3 (ref 1.8–7.7)
SODIUM BLD-SCNC: 141 MEQ/L (ref 135–145)
WBC # BLD: 10.4 THOU/MM3 (ref 4.8–10.8)

## 2022-08-12 PROCEDURE — 80048 BASIC METABOLIC PNL TOTAL CA: CPT

## 2022-08-12 PROCEDURE — 87640 STAPH A DNA AMP PROBE: CPT

## 2022-08-12 PROCEDURE — 82040 ASSAY OF SERUM ALBUMIN: CPT

## 2022-08-12 PROCEDURE — 85025 COMPLETE CBC W/AUTO DIFF WBC: CPT

## 2022-08-12 PROCEDURE — 71046 X-RAY EXAM CHEST 2 VIEWS: CPT

## 2022-08-12 PROCEDURE — 87641 MR-STAPH DNA AMP PROBE: CPT

## 2022-08-12 PROCEDURE — 84134 ASSAY OF PREALBUMIN: CPT

## 2022-08-12 PROCEDURE — 93005 ELECTROCARDIOGRAM TRACING: CPT | Performed by: PHYSICIAN ASSISTANT

## 2022-08-12 PROCEDURE — 36415 COLL VENOUS BLD VENIPUNCTURE: CPT

## 2022-08-12 NOTE — PROGRESS NOTES
Ekg completed without difficulty. Patient tolerated well. Denies shortness of breath or recent chest pain. Patient left in stable condition following ekg.

## 2022-08-13 LAB
EKG ATRIAL RATE: 78 BPM
EKG P AXIS: 63 DEGREES
EKG P-R INTERVAL: 164 MS
EKG Q-T INTERVAL: 372 MS
EKG QRS DURATION: 84 MS
EKG QTC CALCULATION (BAZETT): 424 MS
EKG R AXIS: 55 DEGREES
EKG T AXIS: 42 DEGREES
EKG VENTRICULAR RATE: 78 BPM
MRSA NASAL SCREEN RT-PCR: NEGATIVE
PREALBUMIN: 24.2 MG/DL (ref 20–40)
STAPH AUREUS SCREEN RT-PCR: NEGATIVE

## 2022-08-13 PROCEDURE — 93010 ELECTROCARDIOGRAM REPORT: CPT | Performed by: INTERNAL MEDICINE

## 2022-08-15 ENCOUNTER — HOSPITAL ENCOUNTER (EMERGENCY)
Age: 37
Discharge: HOME | End: 2022-08-15
Attending: EMERGENCY MEDICINE
Payer: MEDICARE

## 2022-08-15 VITALS
DIASTOLIC BLOOD PRESSURE: 74 MMHG | SYSTOLIC BLOOD PRESSURE: 128 MMHG | RESPIRATION RATE: 18 BRPM | OXYGEN SATURATION: 99 % | HEART RATE: 80 BPM | BODY MASS INDEX: 25.18 KG/M2 | HEIGHT: 69 IN | WEIGHT: 170 LBS | TEMPERATURE: 97 F

## 2022-08-15 DIAGNOSIS — J06.9 ACUTE UPPER RESPIRATORY INFECTION: Primary | ICD-10-CM

## 2022-08-15 DIAGNOSIS — Z87.09 HISTORY OF REACTIVE AIRWAY DISEASE: ICD-10-CM

## 2022-08-15 LAB — SARS-COV-2, NAAT: NOT  DETECTED

## 2022-08-15 PROCEDURE — 87635 SARS-COV-2 COVID-19 AMP PRB: CPT

## 2022-08-15 PROCEDURE — 99283 EMERGENCY DEPT VISIT LOW MDM: CPT

## 2022-08-15 RX ORDER — AZITHROMYCIN 250 MG/1
TABLET, FILM COATED ORAL
Qty: 1 PACKET | Refills: 0 | Status: SHIPPED | OUTPATIENT
Start: 2022-08-15 | End: 2022-08-24 | Stop reason: ALTCHOICE

## 2022-08-15 ASSESSMENT — PAIN SCALES - GENERAL: PAINLEVEL_OUTOF10: 4

## 2022-08-15 ASSESSMENT — PAIN - FUNCTIONAL ASSESSMENT: PAIN_FUNCTIONAL_ASSESSMENT: 0-10

## 2022-08-16 NOTE — ED PROVIDER NOTES
[facility]  [facility]   Phone: [address]    Chief Complaint   Patient presents with    Cough     Pt c/o head congestion/cough started yesterday scheduled for back surgery in two weeks \"I want this out of me so I can have the surgery\"       HPI    Juan Williamson is a 40 y.o. female who presents complaint. Patient has URI congestion cough she states been going on for several months. No associate symptoms such as high fever chills or other problems. She has been on couple different courses of antibiotics. She has used nasal decongestants and other things without relief.     PAST MEDICAL HISTORY    Past Medical History:   Diagnosis Date    Anxiety     Blood clotting disorder ([facility] [address]     Depression     Heterozygous hemoglobin S ([facility] [address]     only with pregnancy    Hx of blood clots     Laceration 12    left wrist    Unspecified diseases of blood and blood-forming organs     hypothrombin mutation        SURGICAL HISTORY    Past Surgical History:   Procedure Laterality Date    BACK SURGERY      BACK SURGERY      BACK SURGERY       SECTION  2013     SECTION  2019    HYSTERECTOMY (CERVIX STATUS UNKNOWN) N/A 3/22/2021    HYSTERECTOMY ABDOMINAL LAPAROSCOPIC ROBOTIC, WITH BILATERAL SALPINGECTOMY performed by Carol Ernandez DO at [facility] [address] EXTRACTION         CURRENT MEDICATIONS    Current Outpatient Rx   Medication Sig Dispense Refill    azithromycin (ZITHROMAX) 250 MG tablet Take as directed 1 packet 0    busPIRone (BUSPAR) 5 MG tablet in the morning and at bedtime      ibuprofen (ADVIL;MOTRIN) 600 MG tablet Take 600 mg by mouth every 6 hours as needed for Pain (Patient not taking: Reported on 2022)      acetaminophen (TYLENOL) 500 MG tablet Take 500 mg by mouth every 6 hours as needed for Pain (Patient not taking: Reported on 7/26/2022)         ALLERGIES    Allergies   Allergen Reactions    Adhesive Tape Other (See Comments) and Rash     Redness with plastic tape       FAMILY HISTORY    Family History   Problem Relation Age of Onset    High Blood Pressure Maternal Grandmother     Other Maternal Grandmother     Heart Disease Maternal Grandfather        SOCIAL HISTORY    Social History     Socioeconomic History    Marital status:     Number of children: 1    Years of education: 15   Occupational History    Occupation:      Employer: [employer]   Tobacco Use    Smoking status: Every Day     Packs/day: 0.50     Years: 12.00     Pack years: 6.00     Types: Cigarettes    Smokeless tobacco: Never   Vaping Use    Vaping Use: Never used   Substance and Sexual Activity    Alcohol use: No    Drug use: No    Sexual activity: Yes     Partners: Male     Social Determinants of Health     Financial Resource Strain: Low Risk     Difficulty of Paying Living Expenses: Not hard at all   Food Insecurity: No Food Insecurity    Worried About Running Out of Food in the Last Year: Never true    Ran Out of Food in the Last Year: Never true       REVIEW OF SYSTEMS    Positive for cough and some sputum but no high fever chills. Some myalgias. No chest pain. All systems negative except as marked. PHYSICAL EXAM    VITAL SIGNS: /74   Pulse 80   Temp 97 °F (36.1 °C) (Temporal)   Resp 18   Ht 5' 9\" (1.753 m)   Wt 170 lb (77.1 kg)   LMP  (LMP Unknown)   SpO2 99%   BMI 25.10 kg/m²    Constitutional:  Alert not toxtic or ill,   HENT:  Normocephalic, Atraumatic; postnasal drainage, TMs are normal oropharynx no erythema no exudate  Cervical Spine: Normal range of motion,  No stridor. Eyes:  No discharge or  Swelling  Respiratory: No respiratory distress, occasional expiratory wheeze but no rales  Musculoskeletal:  Intact distal pulses, No edema, No tenderness, No cyanosis, No clubbing.  . No tenderness to palpation or major deformities noted. Integument:  Warm, Dry, No erythema, No rash (on exposed areas)   Neurologic:  Alert & appropriate   Psychiatric:  Affect normal    EKG                      RADIOLOGY    No orders to display           SCREENINGS  /74   Pulse 80   Temp 97 °F (36.1 °C) (Temporal)   Resp 18   Ht 5' 9\" (1.753 m)   Wt 170 lb (77.1 kg)   LMP  (LMP Unknown)   SpO2 99%   BMI 25.10 kg/m²      No orders to display       Screening For Hypertension and Follow-up (#317)  patient informed that blood pressure is normal but should always be re-assessed by primary care      Screening For Tobacco Use and Cessation Intervention (#226):   reports that she has been smoking cigarettes. She has a 6.00 pack-year smoking history. She has never used smokeless tobacco.  Tobacco cessation encouraged with brief counseling. Written home care instructions for smoking cessation provided. Antibiotic usage for acute bronchitis (age 25 years to 59 years) (#116)  History of reactive airway    I        PROCEDURES    none      CONSULTS:  None        ED COURSE & MEDICAL DECISION MAKING    Pertinent Labs & Imaging studies reviewed. (See chart for details)  Patient has URI congestion. Is been going on for months. Is been on a couple different antibiotics. Denies other symptoms such as chest pain abdominal pain vomiting or other issues. Clinical exam is otherwise benign. We did test her for COVID and she is negative. Counseled regards to her chronic recurrent cough congestion is been going on for several months. She just finished a round of antibiotics. Looking at her chart actually it was amoxicillin clavulanic acid. Recommended some nasal steroid she states she has some Nasacort at home. No other indication for other extended or systemic steroids. She is actually scheduled for some back surgery coming soon.   Would prefer not to have any other issues with her immune system at this time until seen by her surgeon. COVID test was negative. FINAL IMPRESSION    1. Acute upper respiratory infection    2.  History of reactive airway disease         PATIENT REFERRED TO:  Medical referral    Call   For evaluation    DISCHARGE MEDICATIONS:  New Prescriptions    AZITHROMYCIN (ZITHROMAX) 250 MG TABLET    Take as directed           Paula Nobles MD  08/15/22 [id number]

## 2022-08-16 NOTE — DISCHARGE INSTRUCTIONS
Stop smoking cigarettes as this can be causes of chronic cough and inflammation of your airway. Take Zithromax as prescribed. Zithromax treats strep throat your infections bronchitis and pneumonia if it is related to bacteria. Continue Nasacort that you have at home. Continue over-the-counter cough and cold medication. Use Tylenol or Motrin for pain.   Follow-up with outpatient medical referral.

## 2022-08-24 ENCOUNTER — OFFICE VISIT (OUTPATIENT)
Dept: INTERNAL MEDICINE CLINIC | Age: 37
End: 2022-08-24
Payer: MEDICARE

## 2022-08-24 VITALS
TEMPERATURE: 97.8 F | WEIGHT: 175.9 LBS | SYSTOLIC BLOOD PRESSURE: 124 MMHG | BODY MASS INDEX: 26.35 KG/M2 | DIASTOLIC BLOOD PRESSURE: 76 MMHG | HEART RATE: 96 BPM

## 2022-08-24 DIAGNOSIS — Z01.818 PREOP EXAM FOR INTERNAL MEDICINE: Primary | ICD-10-CM

## 2022-08-24 DIAGNOSIS — Z72.0 TOBACCO ABUSE: ICD-10-CM

## 2022-08-24 DIAGNOSIS — F41.9 ANXIETY: ICD-10-CM

## 2022-08-24 DIAGNOSIS — D68.52 PROTHROMBIN G20210A MUTATION (HCC): ICD-10-CM

## 2022-08-24 PROCEDURE — G8417 CALC BMI ABV UP PARAM F/U: HCPCS | Performed by: STUDENT IN AN ORGANIZED HEALTH CARE EDUCATION/TRAINING PROGRAM

## 2022-08-24 PROCEDURE — G8427 DOCREV CUR MEDS BY ELIG CLIN: HCPCS | Performed by: STUDENT IN AN ORGANIZED HEALTH CARE EDUCATION/TRAINING PROGRAM

## 2022-08-24 PROCEDURE — 99203 OFFICE O/P NEW LOW 30 MIN: CPT | Performed by: STUDENT IN AN ORGANIZED HEALTH CARE EDUCATION/TRAINING PROGRAM

## 2022-08-24 PROCEDURE — 4004F PT TOBACCO SCREEN RCVD TLK: CPT | Performed by: STUDENT IN AN ORGANIZED HEALTH CARE EDUCATION/TRAINING PROGRAM

## 2022-08-24 NOTE — PROGRESS NOTES
acute complaints at this time. Reactions to anesthesia: none    History of excessive bleeding: none    History of blood clots: Yes. Umbilical cord with subsequent intrauterine fetal demise in . History of blood transfusions: none      Reactions to blood transfusion: none     Past Medical History:   Diagnosis Date    Anxiety     Blood clotting disorder (HCC)     Depression     Heterozygous hemoglobin S ([address]     only with pregnancy    Hx of blood clots 9393    umbilical cord during pregnancy    Laceration 2012    left wrist    PONV (postoperative nausea and vomiting)     Unspecified diseases of blood and blood-forming organs     hypothrombin mutation        Past Surgical History:   Procedure Laterality Date    BACK SURGERY      [address]      BACK SURGERY  2016     SECTION  2013     SECTION  2019    HYSTERECTOMY (CERVIX STATUS UNKNOWN) N/A 2021    HYSTERECTOMY ABDOMINAL LAPAROSCOPIC ROBOTIC, WITH BILATERAL SALPINGECTOMY performed by Gill Montoya DO at [facility] [address] EXTRACTION         Current Outpatient Medications   Medication Sig Dispense Refill    VENTOLIN  (90 Base) MCG/ACT inhaler Inhale 2 puffs into the lungs every 4-6 hours as needed      busPIRone (BUSPAR) 5 MG tablet Take 5 mg by mouth 2 times daily      ibuprofen (ADVIL;MOTRIN) 600 MG tablet Take 600 mg by mouth every 6 hours as needed for Pain       No current facility-administered medications for this visit.        Allergies   Allergen Reactions    Adhesive Tape Other (See Comments) and Rash     Redness with plastic tape       Social History     Socioeconomic History    Marital status:      Spouse name: Not on file    Number of children: 1    Years of education: 12    Highest education level: Not on file   Occupational History    Occupation:      Employer: [employer]   Tobacco Use    Smoking status: Every Day     Packs/day: 0.50     Years: 12.00     Pack years: 6.00     Types: Cigarettes    Smokeless tobacco: Never   Vaping Use    Vaping Use: Never used   Substance and Sexual Activity    Alcohol use: No    Drug use: No    Sexual activity: Yes     Partners: Male   Other Topics Concern    Not on file   Social History Narrative    Not on file     Social Determinants of Health     Financial Resource Strain: Low Risk     Difficulty of Paying Living Expenses: Not hard at all   Food Insecurity: No Food Insecurity    Worried About Running Out of Food in the Last Year: Never true    Ran Out of Food in the Last Year: Never true   Transportation Needs: Not on file   Physical Activity: Not on file   Stress: Not on file   Social Connections: Not on file   Intimate Partner Violence: Not on file   Housing Stability: Not on file       Family History   Problem Relation Age of Onset    High Blood Pressure Maternal Grandmother     Other Maternal Grandmother         Chrohns    Heart Disease Maternal Grandfather        Review of Systems - General ROS: negative for - chills or fever  Psychological ROS: negative for - depression. 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 Cardiovascular ROS: no chest pain or dyspnea on exertion, tolerates a flight of stairs, vacuuming  Gastrointestinal ROS: no abdominal pain, change in bowel habits, or black or bloody stools  Genito-Urinary ROS: no dysuria, trouble voiding, or hematuria  Musculoskeletal ROS: low back pain. Neurological ROS: negative for - headaches, seizures or weakness. Admits to b/l Radiculopathy. Dermatological ROS: negative for - rash or skin lesion changes    Blood pressure 124/76, pulse 96, temperature 97.8 °F (36.6 °C), weight 175 lb 14.4 oz (79.8 kg), unknown if currently breastfeeding.     Physical Examination: General appearance -alert, well appearing, and in no Xarelto. Patient was offered to establish care as she does not follow regularly with a PCP. She states that this time she does not wish to establish care she lives in [place] and does not wish to continue with this for to her PCPs office. She states if she changes her mind she will reach out.      Allergies: Adhesive tape     Case and plan developed in coordination with Dr. Lacey Agrawal    Electronically signed by Mohan Prasad DO on 8/24/2022 at 2:44 PM

## 2022-08-24 NOTE — PATIENT INSTRUCTIONS
Instructed patient to follow up as needed and if she desires to establish care here or see other options closer to where she lives to let us know and we will help her get established.

## 2022-08-29 NOTE — PROGRESS NOTES
Mati Durham at [facility] for clearance from Dr Ness Carlos and [facility]

## 2022-08-30 NOTE — H&P
[address] [place] [address]                       PREOPERATIVE HISTORY AND PHYSICAL    PATIENT NAME: Hardik Ling                      :        1985  MED REC NO:   [id number]                           ROOM:       E4  ACCOUNT NO:   [de-identified]                           ADMIT DATE: 2022  PROVIDER:     Juan Comer PA-C    SURGICAL DATE:  2022    SURGICAL LOCATION:  [address] [place] [address]. SURGERY PLANNED:  Removal of hardware L4-5, L3-4 laminectomy and  interbody fusion with L3 to S1 posterior spinal fusion with  instrumentation performed by Dr. Dodie Carmona. CHIEF COMPLAINT:  Low back pain and predominant right lower extremity  radicular pain. HISTORY OF PRESENT ILLNESS:  The patient is a 40-year-old female who has  presented to our office with complaints of significant low back pain and  predominant right-sided lower extremity radicular pain traveling to the  posterior buttock, thigh and calf on the right-hand side. She had her  previous operation back in the year , and at that time, underwent an  L4 to S1 lumbar decompressive laminectomy and fusion with interbody  fusion completed by Dr. Dan Fleischer. Prior to this in  and , she  also had smaller laminectomy type procedures. The patient reports that  she had continued to deal with symptoms of right leg radicular pain even  after the operation in , as she was unable to give time to recover  slowly. She does feel that her symptoms have continued to worsen and  she is very limited with her ability to mobilize. There is  retrolisthesis and significant stenosis at L3-4 level adjacent to her  previous surgery. She has also been worked up with EMG study which  ultimately showed evidence of continued L5 radicular pain. She has had  injections in the past with very poor results and has refused to undergo  repeat injections.   She has now elected to proceed with further  operative management and has been cleared by her family provider, Dr. Stacy Garibay, to undergo this operation. DRUG ALLERGIES:  INCLUDE ADHESIVE TAPE. PAST MEDICAL HISTORY:  Includes clotting disorder, constipation, anxiety  and depression. CURRENT MEDICATIONS:  Includes buspirone and albuterol as needed. Ibuprofen has been discontinued. PAST SURGICAL HISTORY:  Includes history of D and C, wisdom tooth  extraction, uterine suspension, lumbar laminectomy procedures in   and , completed by Dr. Gadiel Barrera.  section . L4 to S1  laminectomy and fusion with iliac crest bone grafting treated by Dr. Magda Alarcon in 2016. SOCIAL HISTORY:  Smoking status:  She is a current smoker, smoking one  pack per day. She lives at home in a private home. REVIEW OF SYSTEMS:  GENERAL:  Denies fever, fatigue, chills. RESPIRATORY:  Denies shortness of breath, cough, wheezing. CARDIOVASCULAR:  Denies chest pain, palpitations, or leg swelling. GASTROINTESTINAL:  Denies nausea, vomiting, diarrhea, or abdominal pain. GENITOURINARY:  Denies dysuria or bladder incontinence. NEUROLOGIC:  Denies seizures, blackout, fainting or headaches. MUSCULOSKELETAL:  Significant for low back pain, hip pain, leg pain and  muscle pain. PHYSICAL EXAMINATION:  VITAL SIGNS:  Most recent vital signs show height 5 feet 9 inches,  weight 173 pounds, BMI is 25.54. GENERAL:  The patient is a pleasant, cooperative, awake, alert and  oriented x3, seated in a chair in no acute distress. MUSCULOSKELETAL/NEUROLOGIC:  Examination of lumbar spine shows no open  wounds or lesions. Midline scar present. There is paraspinal  tenderness bilateral.  Bilateral lower extremities, skin is warm, dry  and intact. Pulses +2 in the posterior tibial artery. Calves nontender  without evidence of DVT. Strength exam does show 5/5 strength  maintained with pedal push and pull, great toe extension and knee  extension.

## 2022-08-31 ENCOUNTER — ANESTHESIA (OUTPATIENT)
Dept: OPERATING ROOM | Age: 37
DRG: 304 | End: 2022-08-31
Payer: MEDICARE

## 2022-08-31 ENCOUNTER — ANESTHESIA EVENT (OUTPATIENT)
Dept: OPERATING ROOM | Age: 37
DRG: 304 | End: 2022-08-31
Payer: MEDICARE

## 2022-08-31 ENCOUNTER — APPOINTMENT (OUTPATIENT)
Dept: GENERAL RADIOLOGY | Age: 37
DRG: 304 | End: 2022-08-31
Attending: ORTHOPAEDIC SURGERY
Payer: MEDICARE

## 2022-08-31 ENCOUNTER — HOSPITAL ENCOUNTER (INPATIENT)
Age: 37
LOS: 6 days | Discharge: HOME | DRG: 304 | End: 2022-09-06
Attending: ORTHOPAEDIC SURGERY | Admitting: ORTHOPAEDIC SURGERY
Payer: MEDICARE

## 2022-08-31 DIAGNOSIS — Z98.1 S/P LUMBAR SPINAL FUSION: Primary | ICD-10-CM

## 2022-08-31 PROBLEM — M54.16 SPINAL STENOSIS OF LUMBAR REGION WITH RADICULOPATHY: Status: ACTIVE | Noted: 2022-08-31

## 2022-08-31 PROBLEM — M48.061 SPINAL STENOSIS OF LUMBAR REGION WITH RADICULOPATHY: Status: ACTIVE | Noted: 2022-08-31

## 2022-08-31 LAB
ABO: NORMAL
ANTIBODY SCREEN: NORMAL
RH FACTOR: NORMAL

## 2022-08-31 PROCEDURE — 2580000003 HC RX 258: Performed by: PHYSICIAN ASSISTANT

## 2022-08-31 PROCEDURE — 2500000003 HC RX 250 WO HCPCS: Performed by: NURSE ANESTHETIST, CERTIFIED REGISTERED

## 2022-08-31 PROCEDURE — 86900 BLOOD TYPING SEROLOGIC ABO: CPT

## 2022-08-31 PROCEDURE — 6360000002 HC RX W HCPCS: Performed by: NURSE ANESTHETIST, CERTIFIED REGISTERED

## 2022-08-31 PROCEDURE — 3600000015 HC SURGERY LEVEL 5 ADDTL 15MIN: Performed by: ORTHOPAEDIC SURGERY

## 2022-08-31 PROCEDURE — 01NB0ZZ RELEASE LUMBAR NERVE, OPEN APPROACH: ICD-10-PCS | Performed by: ORTHOPAEDIC SURGERY

## 2022-08-31 PROCEDURE — 2500000003 HC RX 250 WO HCPCS: Performed by: ORTHOPAEDIC SURGERY

## 2022-08-31 PROCEDURE — 6360000002 HC RX W HCPCS: Performed by: PHYSICIAN ASSISTANT

## 2022-08-31 PROCEDURE — 00NY0ZZ RELEASE LUMBAR SPINAL CORD, OPEN APPROACH: ICD-10-PCS | Performed by: ORTHOPAEDIC SURGERY

## 2022-08-31 PROCEDURE — 86850 RBC ANTIBODY SCREEN: CPT

## 2022-08-31 PROCEDURE — 3700000000 HC ANESTHESIA ATTENDED CARE: Performed by: ORTHOPAEDIC SURGERY

## 2022-08-31 PROCEDURE — P9045 ALBUMIN (HUMAN), 5%, 250 ML: HCPCS | Performed by: NURSE ANESTHETIST, CERTIFIED REGISTERED

## 2022-08-31 PROCEDURE — C1889 IMPLANT/INSERT DEVICE, NOC: HCPCS | Performed by: ORTHOPAEDIC SURGERY

## 2022-08-31 PROCEDURE — 0SG00AJ FUSION OF LUMBAR VERTEBRAL JOINT WITH INTERBODY FUSION DEVICE, POSTERIOR APPROACH, ANTERIOR COLUMN, OPEN APPROACH: ICD-10-PCS | Performed by: ORTHOPAEDIC SURGERY

## 2022-08-31 PROCEDURE — 00QT0ZZ REPAIR SPINAL MENINGES, OPEN APPROACH: ICD-10-PCS | Performed by: ORTHOPAEDIC SURGERY

## 2022-08-31 PROCEDURE — 72100 X-RAY EXAM L-S SPINE 2/3 VWS: CPT

## 2022-08-31 PROCEDURE — C1713 ANCHOR/SCREW BN/BN,TIS/BN: HCPCS | Performed by: ORTHOPAEDIC SURGERY

## 2022-08-31 PROCEDURE — 0QP004Z REMOVAL OF INTERNAL FIXATION DEVICE FROM LUMBAR VERTEBRA, OPEN APPROACH: ICD-10-PCS | Performed by: ORTHOPAEDIC SURGERY

## 2022-08-31 PROCEDURE — 3600000005 HC SURGERY LEVEL 5 BASE: Performed by: ORTHOPAEDIC SURGERY

## 2022-08-31 PROCEDURE — 36415 COLL VENOUS BLD VENIPUNCTURE: CPT

## 2022-08-31 PROCEDURE — 6370000000 HC RX 637 (ALT 250 FOR IP): Performed by: PHYSICIAN ASSISTANT

## 2022-08-31 PROCEDURE — 0SG1071 FUSION OF 2 OR MORE LUMBAR VERTEBRAL JOINTS WITH AUTOLOGOUS TISSUE SUBSTITUTE, POSTERIOR APPROACH, POSTERIOR COLUMN, OPEN APPROACH: ICD-10-PCS | Performed by: ORTHOPAEDIC SURGERY

## 2022-08-31 PROCEDURE — 7100000000 HC PACU RECOVERY - FIRST 15 MIN: Performed by: ORTHOPAEDIC SURGERY

## 2022-08-31 PROCEDURE — 2580000003 HC RX 258: Performed by: NURSE ANESTHETIST, CERTIFIED REGISTERED

## 2022-08-31 PROCEDURE — 0SG3071 FUSION OF LUMBOSACRAL JOINT WITH AUTOLOGOUS TISSUE SUBSTITUTE, POSTERIOR APPROACH, POSTERIOR COLUMN, OPEN APPROACH: ICD-10-PCS | Performed by: ORTHOPAEDIC SURGERY

## 2022-08-31 PROCEDURE — 0SB20ZZ EXCISION OF LUMBAR VERTEBRAL DISC, OPEN APPROACH: ICD-10-PCS | Performed by: ORTHOPAEDIC SURGERY

## 2022-08-31 PROCEDURE — 6370000000 HC RX 637 (ALT 250 FOR IP): Performed by: ANESTHESIOLOGY

## 2022-08-31 PROCEDURE — 0QP104Z REMOVAL OF INTERNAL FIXATION DEVICE FROM SACRUM, OPEN APPROACH: ICD-10-PCS | Performed by: ORTHOPAEDIC SURGERY

## 2022-08-31 PROCEDURE — 3209999900 FLUORO FOR SURGICAL PROCEDURES

## 2022-08-31 PROCEDURE — 1200000000 HC SEMI PRIVATE

## 2022-08-31 PROCEDURE — 6360000002 HC RX W HCPCS: Performed by: ANESTHESIOLOGY

## 2022-08-31 PROCEDURE — 6360000002 HC RX W HCPCS

## 2022-08-31 PROCEDURE — 7100000001 HC PACU RECOVERY - ADDTL 15 MIN: Performed by: ORTHOPAEDIC SURGERY

## 2022-08-31 PROCEDURE — 86901 BLOOD TYPING SEROLOGIC RH(D): CPT

## 2022-08-31 PROCEDURE — 2709999900 HC NON-CHARGEABLE SUPPLY: Performed by: ORTHOPAEDIC SURGERY

## 2022-08-31 PROCEDURE — 2720000010 HC SURG SUPPLY STERILE: Performed by: ORTHOPAEDIC SURGERY

## 2022-08-31 PROCEDURE — 3700000001 HC ADD 15 MINUTES (ANESTHESIA): Performed by: ORTHOPAEDIC SURGERY

## 2022-08-31 DEVICE — DBM T43105 5CC GRAFTON PUTTY
Type: IMPLANTABLE DEVICE | Site: SPINE LUMBAR | Status: FUNCTIONAL
Brand: GRAFTON®AND GRAFTON PLUS®DEMINERALIZED BONE MATRIX (DBM)

## 2022-08-31 DEVICE — ROD 1553201100 5.5 TI CP4 NS CURV 100MM
Type: IMPLANTABLE DEVICE | Site: SPINE LUMBAR | Status: FUNCTIONAL
Brand: CD HORIZON® SPINAL SYSTEM

## 2022-08-31 DEVICE — SPACER 8880928 ELEVATE X-LOR 28X9MM
Type: IMPLANTABLE DEVICE | Site: SPINE LUMBAR | Status: FUNCTIONAL
Brand: ELEVATE™ SPINAL SYSTEM

## 2022-08-31 RX ORDER — SODIUM CHLORIDE 9 MG/ML
INJECTION, SOLUTION INTRAVENOUS PRN
Status: DISCONTINUED | OUTPATIENT
Start: 2022-08-31 | End: 2022-08-31 | Stop reason: HOSPADM

## 2022-08-31 RX ORDER — OXYCODONE HYDROCHLORIDE AND ACETAMINOPHEN 5; 325 MG/1; MG/1
2 TABLET ORAL EVERY 4 HOURS PRN
Status: DISCONTINUED | OUTPATIENT
Start: 2022-08-31 | End: 2022-09-06 | Stop reason: HOSPADM

## 2022-08-31 RX ORDER — MEPERIDINE HYDROCHLORIDE 25 MG/ML
12.5 INJECTION INTRAMUSCULAR; INTRAVENOUS; SUBCUTANEOUS EVERY 5 MIN PRN
Status: DISCONTINUED | OUTPATIENT
Start: 2022-08-31 | End: 2022-08-31 | Stop reason: HOSPADM

## 2022-08-31 RX ORDER — ONDANSETRON 2 MG/ML
4 INJECTION INTRAMUSCULAR; INTRAVENOUS EVERY 6 HOURS PRN
Status: DISCONTINUED | OUTPATIENT
Start: 2022-08-31 | End: 2022-09-06 | Stop reason: HOSPADM

## 2022-08-31 RX ORDER — SODIUM CHLORIDE 9 MG/ML
INJECTION, SOLUTION INTRAVENOUS PRN
Status: DISCONTINUED | OUTPATIENT
Start: 2022-08-31 | End: 2022-09-06 | Stop reason: HOSPADM

## 2022-08-31 RX ORDER — SODIUM CHLORIDE 0.9 % (FLUSH) 0.9 %
5-40 SYRINGE (ML) INJECTION EVERY 12 HOURS SCHEDULED
Status: DISCONTINUED | OUTPATIENT
Start: 2022-08-31 | End: 2022-08-31 | Stop reason: HOSPADM

## 2022-08-31 RX ORDER — PROPOFOL 10 MG/ML
INJECTION, EMULSION INTRAVENOUS CONTINUOUS PRN
Status: DISCONTINUED | OUTPATIENT
Start: 2022-08-31 | End: 2022-08-31 | Stop reason: SDUPTHER

## 2022-08-31 RX ORDER — ALBUMIN, HUMAN INJ 5% 5 %
SOLUTION INTRAVENOUS PRN
Status: DISCONTINUED | OUTPATIENT
Start: 2022-08-31 | End: 2022-08-31 | Stop reason: SDUPTHER

## 2022-08-31 RX ORDER — ONDANSETRON 2 MG/ML
INJECTION INTRAMUSCULAR; INTRAVENOUS PRN
Status: DISCONTINUED | OUTPATIENT
Start: 2022-08-31 | End: 2022-08-31 | Stop reason: SDUPTHER

## 2022-08-31 RX ORDER — CLOTRIMAZOLE 1 %
CREAM (GRAM) TOPICAL 2 TIMES DAILY
Status: DISCONTINUED | OUTPATIENT
Start: 2022-08-31 | End: 2022-09-06 | Stop reason: HOSPADM

## 2022-08-31 RX ORDER — LIDOCAINE HYDROCHLORIDE 20 MG/ML
INJECTION, SOLUTION EPIDURAL; INFILTRATION; INTRACAUDAL; PERINEURAL PRN
Status: DISCONTINUED | OUTPATIENT
Start: 2022-08-31 | End: 2022-08-31 | Stop reason: SDUPTHER

## 2022-08-31 RX ORDER — SODIUM CHLORIDE 9 MG/ML
INJECTION, SOLUTION INTRAVENOUS CONTINUOUS
Status: DISCONTINUED | OUTPATIENT
Start: 2022-08-31 | End: 2022-09-02

## 2022-08-31 RX ORDER — BUSPIRONE HYDROCHLORIDE 5 MG/1
5 TABLET ORAL 2 TIMES DAILY
Status: DISCONTINUED | OUTPATIENT
Start: 2022-08-31 | End: 2022-09-06 | Stop reason: HOSPADM

## 2022-08-31 RX ORDER — ALBUTEROL SULFATE 90 UG/1
2 AEROSOL, METERED RESPIRATORY (INHALATION) EVERY 4 HOURS PRN
Status: DISCONTINUED | OUTPATIENT
Start: 2022-08-31 | End: 2022-09-06 | Stop reason: HOSPADM

## 2022-08-31 RX ORDER — SCOLOPAMINE TRANSDERMAL SYSTEM 1 MG/1
1 PATCH, EXTENDED RELEASE TRANSDERMAL ONCE
Status: COMPLETED | OUTPATIENT
Start: 2022-08-31 | End: 2022-09-03

## 2022-08-31 RX ORDER — ONDANSETRON 4 MG/1
4 TABLET, ORALLY DISINTEGRATING ORAL EVERY 8 HOURS PRN
Status: DISCONTINUED | OUTPATIENT
Start: 2022-08-31 | End: 2022-09-06 | Stop reason: HOSPADM

## 2022-08-31 RX ORDER — SODIUM CHLORIDE 0.9 % (FLUSH) 0.9 %
5-40 SYRINGE (ML) INJECTION PRN
Status: DISCONTINUED | OUTPATIENT
Start: 2022-08-31 | End: 2022-09-06 | Stop reason: HOSPADM

## 2022-08-31 RX ORDER — LIDOCAINE HYDROCHLORIDE AND EPINEPHRINE 10; 10 MG/ML; UG/ML
INJECTION, SOLUTION INFILTRATION; PERINEURAL PRN
Status: DISCONTINUED | OUTPATIENT
Start: 2022-08-31 | End: 2022-08-31 | Stop reason: ALTCHOICE

## 2022-08-31 RX ORDER — CYCLOBENZAPRINE HCL 10 MG
10 TABLET ORAL 3 TIMES DAILY PRN
Status: DISCONTINUED | OUTPATIENT
Start: 2022-08-31 | End: 2022-09-06 | Stop reason: HOSPADM

## 2022-08-31 RX ORDER — OXYCODONE HYDROCHLORIDE AND ACETAMINOPHEN 5; 325 MG/1; MG/1
1 TABLET ORAL EVERY 4 HOURS PRN
Status: DISCONTINUED | OUTPATIENT
Start: 2022-08-31 | End: 2022-09-06 | Stop reason: HOSPADM

## 2022-08-31 RX ORDER — POLYETHYLENE GLYCOL 3350 17 G/17G
17 POWDER, FOR SOLUTION ORAL DAILY
Status: DISCONTINUED | OUTPATIENT
Start: 2022-08-31 | End: 2022-09-04

## 2022-08-31 RX ORDER — FENTANYL CITRATE 50 UG/ML
INJECTION, SOLUTION INTRAMUSCULAR; INTRAVENOUS PRN
Status: DISCONTINUED | OUTPATIENT
Start: 2022-08-31 | End: 2022-08-31 | Stop reason: SDUPTHER

## 2022-08-31 RX ORDER — SODIUM CHLORIDE 9 MG/ML
INJECTION, SOLUTION INTRAVENOUS CONTINUOUS PRN
Status: DISCONTINUED | OUTPATIENT
Start: 2022-08-31 | End: 2022-08-31 | Stop reason: SDUPTHER

## 2022-08-31 RX ORDER — ROCURONIUM BROMIDE 10 MG/ML
INJECTION, SOLUTION INTRAVENOUS PRN
Status: DISCONTINUED | OUTPATIENT
Start: 2022-08-31 | End: 2022-08-31 | Stop reason: SDUPTHER

## 2022-08-31 RX ORDER — MIDAZOLAM HYDROCHLORIDE 1 MG/ML
INJECTION INTRAMUSCULAR; INTRAVENOUS PRN
Status: DISCONTINUED | OUTPATIENT
Start: 2022-08-31 | End: 2022-08-31 | Stop reason: SDUPTHER

## 2022-08-31 RX ORDER — PROPOFOL 10 MG/ML
INJECTION, EMULSION INTRAVENOUS PRN
Status: DISCONTINUED | OUTPATIENT
Start: 2022-08-31 | End: 2022-08-31 | Stop reason: SDUPTHER

## 2022-08-31 RX ORDER — SODIUM CHLORIDE 0.9 % (FLUSH) 0.9 %
5-40 SYRINGE (ML) INJECTION PRN
Status: DISCONTINUED | OUTPATIENT
Start: 2022-08-31 | End: 2022-08-31 | Stop reason: HOSPADM

## 2022-08-31 RX ORDER — SUCCINYLCHOLINE CHLORIDE 20 MG/ML
INJECTION INTRAMUSCULAR; INTRAVENOUS PRN
Status: DISCONTINUED | OUTPATIENT
Start: 2022-08-31 | End: 2022-08-31 | Stop reason: SDUPTHER

## 2022-08-31 RX ORDER — SENNA AND DOCUSATE SODIUM 50; 8.6 MG/1; MG/1
1 TABLET, FILM COATED ORAL 2 TIMES DAILY
Status: DISCONTINUED | OUTPATIENT
Start: 2022-08-31 | End: 2022-09-06 | Stop reason: HOSPADM

## 2022-08-31 RX ORDER — ONDANSETRON 2 MG/ML
4 INJECTION INTRAMUSCULAR; INTRAVENOUS
Status: DISCONTINUED | OUTPATIENT
Start: 2022-08-31 | End: 2022-08-31 | Stop reason: HOSPADM

## 2022-08-31 RX ORDER — SODIUM CHLORIDE 0.9 % (FLUSH) 0.9 %
5-40 SYRINGE (ML) INJECTION EVERY 12 HOURS SCHEDULED
Status: DISCONTINUED | OUTPATIENT
Start: 2022-08-31 | End: 2022-09-06 | Stop reason: HOSPADM

## 2022-08-31 RX ORDER — FENTANYL CITRATE 50 UG/ML
INJECTION, SOLUTION INTRAMUSCULAR; INTRAVENOUS
Status: COMPLETED
Start: 2022-08-31 | End: 2022-08-31

## 2022-08-31 RX ORDER — FENTANYL CITRATE 50 UG/ML
50 INJECTION, SOLUTION INTRAMUSCULAR; INTRAVENOUS EVERY 5 MIN PRN
Status: COMPLETED | OUTPATIENT
Start: 2022-08-31 | End: 2022-08-31

## 2022-08-31 RX ADMIN — FENTANYL CITRATE 50 MCG: 50 INJECTION, SOLUTION INTRAMUSCULAR; INTRAVENOUS at 13:40

## 2022-08-31 RX ADMIN — HYDROMORPHONE HYDROCHLORIDE 0.5 MG: 1 INJECTION, SOLUTION INTRAMUSCULAR; INTRAVENOUS; SUBCUTANEOUS at 14:00

## 2022-08-31 RX ADMIN — ALBUMIN (HUMAN) 250 ML: 12.5 SOLUTION INTRAVENOUS at 12:49

## 2022-08-31 RX ADMIN — FENTANYL CITRATE 100 MCG: 50 INJECTION, SOLUTION INTRAMUSCULAR; INTRAVENOUS at 10:22

## 2022-08-31 RX ADMIN — PHENYLEPHRINE HYDROCHLORIDE 200 MCG: 10 INJECTION INTRAVENOUS at 12:02

## 2022-08-31 RX ADMIN — PHENYLEPHRINE HYDROCHLORIDE 100 MCG: 10 INJECTION INTRAVENOUS at 11:49

## 2022-08-31 RX ADMIN — SODIUM CHLORIDE: 9 INJECTION, SOLUTION INTRAVENOUS at 11:33

## 2022-08-31 RX ADMIN — PROPOFOL 150 MG: 10 INJECTION, EMULSION INTRAVENOUS at 10:22

## 2022-08-31 RX ADMIN — OXYCODONE AND ACETAMINOPHEN 2 TABLET: 5; 325 TABLET ORAL at 15:22

## 2022-08-31 RX ADMIN — HYDROMORPHONE HYDROCHLORIDE 0.5 MG: 1 INJECTION, SOLUTION INTRAMUSCULAR; INTRAVENOUS; SUBCUTANEOUS at 14:10

## 2022-08-31 RX ADMIN — SODIUM CHLORIDE: 9 INJECTION, SOLUTION INTRAVENOUS at 12:13

## 2022-08-31 RX ADMIN — ROCURONIUM BROMIDE 30 MG: 10 INJECTION, SOLUTION INTRAVENOUS at 11:30

## 2022-08-31 RX ADMIN — HYDROMORPHONE HYDROCHLORIDE 0.25 MG: 1 INJECTION, SOLUTION INTRAMUSCULAR; INTRAVENOUS; SUBCUTANEOUS at 13:55

## 2022-08-31 RX ADMIN — Medication 120 MG: at 10:22

## 2022-08-31 RX ADMIN — PHENYLEPHRINE HYDROCHLORIDE 100 MCG: 10 INJECTION INTRAVENOUS at 12:49

## 2022-08-31 RX ADMIN — ONDANSETRON 4 MG: 2 INJECTION INTRAMUSCULAR; INTRAVENOUS at 13:02

## 2022-08-31 RX ADMIN — CEFAZOLIN 2000 MG: 10 INJECTION, POWDER, FOR SOLUTION INTRAVENOUS at 10:30

## 2022-08-31 RX ADMIN — OXYCODONE AND ACETAMINOPHEN 2 TABLET: 5; 325 TABLET ORAL at 21:32

## 2022-08-31 RX ADMIN — SODIUM CHLORIDE: 9 INJECTION, SOLUTION INTRAVENOUS at 15:15

## 2022-08-31 RX ADMIN — BISACODYL 5 MG: 5 TABLET, COATED ORAL at 18:46

## 2022-08-31 RX ADMIN — MIDAZOLAM 2 MG: 1 INJECTION INTRAMUSCULAR; INTRAVENOUS at 10:22

## 2022-08-31 RX ADMIN — CLOTRIMAZOLE: 0.01 CREAM TOPICAL at 18:46

## 2022-08-31 RX ADMIN — CLOTRIMAZOLE: 0.01 CREAM TOPICAL at 20:18

## 2022-08-31 RX ADMIN — HYDROMORPHONE HYDROCHLORIDE 0.25 MG: 1 INJECTION, SOLUTION INTRAMUSCULAR; INTRAVENOUS; SUBCUTANEOUS at 13:50

## 2022-08-31 RX ADMIN — BUSPIRONE HYDROCHLORIDE 5 MG: 5 TABLET ORAL at 20:18

## 2022-08-31 RX ADMIN — ROCURONIUM BROMIDE 30 MG: 10 INJECTION, SOLUTION INTRAVENOUS at 10:33

## 2022-08-31 RX ADMIN — SUGAMMADEX 200 MG: 100 INJECTION, SOLUTION INTRAVENOUS at 13:20

## 2022-08-31 RX ADMIN — Medication 80 MG: at 10:22

## 2022-08-31 RX ADMIN — SODIUM CHLORIDE: 9 INJECTION, SOLUTION INTRAVENOUS at 11:05

## 2022-08-31 RX ADMIN — HYDROMORPHONE HYDROCHLORIDE 0.5 MG: 1 INJECTION, SOLUTION INTRAMUSCULAR; INTRAVENOUS; SUBCUTANEOUS at 18:35

## 2022-08-31 RX ADMIN — SODIUM CHLORIDE: 9 INJECTION, SOLUTION INTRAVENOUS at 09:07

## 2022-08-31 RX ADMIN — ALBUMIN (HUMAN) 250 ML: 12.5 SOLUTION INTRAVENOUS at 12:38

## 2022-08-31 RX ADMIN — PROPOFOL 120 MCG/KG/MIN: 10 INJECTION, EMULSION INTRAVENOUS at 10:43

## 2022-08-31 RX ADMIN — FENTANYL CITRATE 50 MCG: 50 INJECTION, SOLUTION INTRAMUSCULAR; INTRAVENOUS at 13:45

## 2022-08-31 RX ADMIN — CYCLOBENZAPRINE 10 MG: 10 TABLET, FILM COATED ORAL at 23:51

## 2022-08-31 RX ADMIN — CEFAZOLIN 2000 MG: 10 INJECTION, POWDER, FOR SOLUTION INTRAVENOUS at 18:42

## 2022-08-31 RX ADMIN — ROCURONIUM BROMIDE 20 MG: 10 INJECTION, SOLUTION INTRAVENOUS at 10:44

## 2022-08-31 RX ADMIN — POLYETHYLENE GLYCOL 3350 17 G: 17 POWDER, FOR SOLUTION ORAL at 18:46

## 2022-08-31 RX ADMIN — SENNOSIDES AND DOCUSATE SODIUM 1 TABLET: 50; 8.6 TABLET ORAL at 20:20

## 2022-08-31 ASSESSMENT — PAIN SCALES - GENERAL
PAINLEVEL_OUTOF10: 6
PAINLEVEL_OUTOF10: 10
PAINLEVEL_OUTOF10: 8
PAINLEVEL_OUTOF10: 10
PAINLEVEL_OUTOF10: 10
PAINLEVEL_OUTOF10: 4
PAINLEVEL_OUTOF10: 10
PAINLEVEL_OUTOF10: 8
PAINLEVEL_OUTOF10: 6

## 2022-08-31 ASSESSMENT — PAIN DESCRIPTION - ORIENTATION
ORIENTATION: MID;LOWER

## 2022-08-31 ASSESSMENT — PAIN DESCRIPTION - FREQUENCY: FREQUENCY: CONTINUOUS

## 2022-08-31 ASSESSMENT — PAIN - FUNCTIONAL ASSESSMENT
PAIN_FUNCTIONAL_ASSESSMENT: 0-10
PAIN_FUNCTIONAL_ASSESSMENT: INTOLERABLE, UNABLE TO DO ANY ACTIVE OR PASSIVE ACTIVITIES

## 2022-08-31 ASSESSMENT — PAIN DESCRIPTION - LOCATION
LOCATION: BACK

## 2022-08-31 ASSESSMENT — PAIN DESCRIPTION - DESCRIPTORS
DESCRIPTORS: ACHING;THROBBING
DESCRIPTORS: ACHING

## 2022-08-31 ASSESSMENT — PAIN DESCRIPTION - PAIN TYPE: TYPE: SURGICAL PAIN

## 2022-08-31 ASSESSMENT — PAIN DESCRIPTION - ONSET: ONSET: ON-GOING

## 2022-08-31 NOTE — ANESTHESIA PRE PROCEDURE
Department of Anesthesiology  Preprocedure Note       Name:  Danis Ferrell   Age:  40 y.o.  :  1985                                          MRN:  [id number]         Date:  2022      Surgeon: Monika Spencer):  Leonidas Morton MD    Procedure: Procedure(s):  REMOVAL OF HARDWARE L4-S1, LUMBAR LAMINECTOMY PLIF L3-L4, PSF L3-S1 WITH SURGALIGN    Medications prior to admission:   Prior to Admission medications    Medication Sig Start Date End Date Taking? Authorizing Provider   VENTOLIN  (90 Base) MCG/ACT inhaler Inhale 2 puffs into the lungs every 4-6 hours as needed 8/10/22   Historical Provider, MD   busPIRone (BUSPAR) 5 MG tablet Take 5 mg by mouth 2 times daily 22   Historical Provider, MD   ibuprofen (ADVIL;MOTRIN) 600 MG tablet Take 600 mg by mouth every 6 hours as needed for Pain    Historical Provider, MD       Current medications:    Current Facility-Administered Medications   Medication Dose Route Frequency Provider Last Rate Last Admin    sodium chloride flush 0.9 % injection 5-40 mL  5-40 mL IntraVENous 2 times per day James Wooten PA-C        sodium chloride flush 0.9 % injection 5-40 mL  5-40 mL IntraVENous PRN Lorean ANDRES Wooten        0.9 % sodium chloride infusion   IntraVENous PRN James Wooten PA-C 20 mL/hr at 22 0907 New Bag at 22 0907    ceFAZolin (ANCEF) 2000 mg in dextrose 5 % 50 mL IVPB  2,000 mg IntraVENous On Call to Juan C Weston PA-C        scopolamine (TRANSDERM-SCOP) transdermal patch 1 patch  1 patch TransDERmal Once [address], DO   1 patch at 22 0911       Allergies:     Allergies   Allergen Reactions    Adhesive Tape Other (See Comments) and Rash     Redness with plastic tape    Morphine Nausea And Vomiting       Problem List:    Patient Active Problem List   Diagnosis Code    Orthostatic lightheadedness R42    Depression with anxiety F41.8    Abdominal pain R10.9    Spinal stenosis of lumbar region with radiculopathy M48.061, M54.16       Past Medical History:        Diagnosis Date    Anxiety     Blood clotting disorder ([facility] [address]     Depression     Heterozygous hemoglobin S ([facility] [address]     only with pregnancy    Hx of blood clots     umbilical cord during pregnancy    Laceration 2012    left wrist    PONV (postoperative nausea and vomiting)     Unspecified diseases of blood and blood-forming organs     hypothrombin mutation [id number]       Past Surgical History:        Procedure Laterality Date    BACK SURGERY      BACK SURGERY      BACK SURGERY  2016     SECTION  2013     SECTION  2019    HYSTERECTOMY (CERVIX STATUS UNKNOWN) N/A 2021    HYSTERECTOMY ABDOMINAL LAPAROSCOPIC ROBOTIC, WITH BILATERAL SALPINGECTOMY performed by Christopher Garrison DO at [address] [facility]      WISDOM TOOTH EXTRACTION         Social History:    Social History     Tobacco Use    Smoking status: Every Day     Packs/day: 0.50     Years: 12.00     Pack years: 6.00     Types: Cigarettes    Smokeless tobacco: Never   Substance Use Topics    Alcohol use: No     Comment: occasionally                                Ready to quit: Not Answered  Counseling given: Not Answered      Vital Signs (Current):   Vitals:    22 0755 22 0828   BP: 115/71    Pulse: 85    Resp: 20    Temp: 97.1 °F (36.2 °C)    TempSrc: Temporal    SpO2: 98%    Weight:  178 lb 3.2 oz (80.8 kg)   Height:  5' 9\" (1.753 m)                                              BP Readings from Last 3 Encounters:   22 115/71   22 117/78   22 124/76       NPO Status: Time of last liquid consumption:                         Time of last solid consumption:                         Date of last liquid consumption: 22                        Date of last solid food consumption: 22    BMI:   Wt Readings from Last 3 Encounters:   22 178 lb 3.2 oz (80.8 kg)   22 176 lb 5.9 oz (80 kg)   08/24/22 175 lb 14.4 oz (79.8 kg)     Body mass index is 26.32 kg/m². CBC:   Lab Results   Component Value Date/Time    WBC 10.4 08/12/2022 05:05 PM    RBC 4.78 08/12/2022 05:05 PM    RBC 3.90 07/21/2011 08:45 PM    HGB 14.6 08/12/2022 05:05 PM    HCT 44.5 08/12/2022 05:05 PM    MCV 93.1 08/12/2022 05:05 PM    RDW 13.4 08/25/2016 06:15 PM     08/12/2022 05:05 PM       CMP:   Lab Results   Component Value Date/Time     08/12/2022 05:05 PM    K 3.7 08/12/2022 05:05 PM     08/12/2022 05:05 PM    CO2 29 08/12/2022 05:05 PM    BUN 13 08/12/2022 05:05 PM    CREATININE 0.6 08/12/2022 05:05 PM    LABGLOM >90 08/12/2022 05:05 PM    GLUCOSE 95 08/12/2022 05:05 PM    PROT 7.2 11/23/2015 02:57 PM    CALCIUM 9.6 08/12/2022 05:05 PM    BILITOT 0.2 11/23/2015 02:57 PM    ALKPHOS 62 11/23/2015 02:57 PM    AST 14 11/23/2015 02:57 PM    ALT 18 11/23/2015 02:57 PM       POC Tests: No results for input(s): POCGLU, POCNA, POCK, POCCL, POCBUN, POCHEMO, POCHCT in the last 72 hours. Coags:   Lab Results   Component Value Date/Time    INR 0.88 12/16/2013 05:00 PM    APTT 31.2 12/16/2013 05:00 PM       HCG (If Applicable):   Lab Results   Component Value Date    PREGTESTUR negative 03/22/2021    PREGSERUM NEGATIVE 11/23/2015        ABGs: No results found for: PHART, PO2ART, YWW5RYE, NSW3OHV, BEART, H3EOZLCR     Type & Screen (If Applicable):  Lab Results   Component Value Date    LABRH POS 08/31/2022       Drug/Infectious Status (If Applicable):  No results found for: HIV, HEPCAB    COVID-19 Screening (If Applicable):   Lab Results   Component Value Date/Time    COVID19 NOT  DETECTED 08/15/2022 09:30 PM    COVID19 Not Detected 03/15/2021 07:23 PM           Anesthesia Evaluation  Patient summary reviewed and Nursing notes reviewed   history of anesthetic complications: PONV.   Airway: Mallampati: II  TM distance: >3 FB   Neck ROM: full  Mouth opening: > = 3 FB   Dental:          Pulmonary:Negative Pulmonary ROS and normal exam  breath sounds clear to auscultation                             Cardiovascular:Negative CV ROS  Exercise tolerance: good (>4 METS),                     Neuro/Psych:   (+) neuromuscular disease:, psychiatric history:            GI/Hepatic/Renal: Neg GI/Hepatic/Renal ROS            Endo/Other: Negative Endo/Other ROS             Pt had no PAT visit       Abdominal:             Vascular:   + DVT, . Other Findings:           Anesthesia Plan      general     ASA 2       Induction: intravenous. MIPS: Postoperative opioids intended and Prophylactic antiemetics administered. Anesthetic plan and risks discussed with patient. Plan discussed with CRNA.                     [facility], DO   8/31/2022

## 2022-08-31 NOTE — CONSULTS
Oral Daily    sennosides-docusate sodium  1 tablet Oral BID    [START ON 9/1/2022] naloxegol  12.5 mg Oral QAM AC     Continuous Infusions:   sodium chloride 100 mL/hr at 08/31/22 1515    sodium chloride       PRN Meds:.albuterol sulfate HFA, sodium chloride flush, sodium chloride, ondansetron **OR** ondansetron, cyclobenzaprine, HYDROmorphone **OR** HYDROmorphone, magnesium hydroxide, magnesium hydroxide, oxyCODONE-acetaminophen **OR** oxyCODONE-acetaminophen    Allergies:  Adhesive tape and Morphine    Social History:   TOBACCO:   reports that she has been smoking cigarettes. She has a 6.00 pack-year smoking history. She has never used smokeless tobacco.  ETOH:   reports no history of alcohol use. Family History:       Problem Relation Age of Onset    High Blood Pressure Maternal Grandmother     Other Maternal Grandmother         Chrohns    Heart Disease Maternal Grandfather        Physical Exam:    Vitals: /69   Pulse 68   Temp 97.9 °F (36.6 °C) (Oral)   Resp 16   Ht 5' 9\" (1.753 m)   Wt 178 lb 3.2 oz (80.8 kg)   LMP  (LMP Unknown)   SpO2 97%   BMI 26.32 kg/m²   General appearance: alert, appears stated age and cooperative  Skin: Skin color, texture, turgor normal. No rashes or lesions  HEENT: Head: Normal, normocephalic, atraumatic. Neck: no adenopathy, no carotid bruit, no JVD, and supple, symmetrical, trachea midline  Lungs: clear to auscultation bilaterally  Heart: regular rate and rhythm, S1, S2 normal, no murmur, click, rub or gallop  Abdomen: soft, non-tender; bowel sounds normal; no masses,  no organomegaly  Extremities: no edema, redness or tenderness in the calves or thighs  Neurologic: Mental status: Alert, oriented, thought content appropriate  Back: drains x2 in situ    Assessment and Plan     Ruptured lumbar intervertebral disc [M51.26]  S/p lumbar laminectomy, PLIFL3-4, PSF L3-S1  VAZQUEZ    Cont post op care. SCD. Bowel regimen  Resume Buspar.   Thank you for the consult  Sona.     Patient Active Problem List   Diagnosis Code    Orthostatic lightheadedness R42    Depression with anxiety F41.8    Abdominal pain R10.9    Spinal stenosis of lumbar region with radiculopathy M48.061, M54.16       Diana Jarquin MD, MD  Consulting Internist.  8/31/2022

## 2022-08-31 NOTE — BRIEF OP NOTE
Brief Postoperative Note      Patient: Sariah Torres  YOB: 1985  MRN: [id number]    Date of Procedure: 8/31/2022    Pre-Op Diagnosis: Ruptured lumbar intervertebral disc [M51.26]    Post-Op Diagnosis: Same       Procedure(s):  REMOVAL OF HARDWARE L4-S1, LUMBAR LAMINECTOMY PLIF L3-L4, PSF L3-S1 WITH SURGALIGN    Surgeon(s):  Kulwinder Villeda MD    Assistant:  Nando [facility]    Anesthesia: General    Estimated Blood Loss (mL): 349    Complications: CSF leak at L4 level repaired    Specimens:   * No specimens in log *    Implants:  Implant Name Type Inv. Item Serial No.  Lot No. LRB No. Used Action   GRAFT BNE SUB 5CC DEMIN BNE MTRX [facility] [id number]  GRAFT BNE SUB 5CC DEMIN BNE MTRX PTTY GRFTON [id number] [employer] SPINALGRAFT TECH-WD  N/A 1 Implanted   SPACER SPNL [id number] INTERVERTEBRAL PEEK LUM X LORD FOR BNE - [id number]  SPACER SPNL [id number] INTERVERTEBRAL PEEK LUM X LORD FOR BNE  MEDTRONIC [facility] DANEK-WD [id number] N/A 1 Implanted   TONY SPNL L100MM DIA5. 5MM TI ANT POST THORACOLUMBOSACRAL CRV - [id number]  TONY SPNL L100MM DIA5. 5MM TI ANT POST THORACOLUMBOSACRAL CRV  MEDTRONIC SOFAMOR DANEK-WD  N/A 2 Implanted   SET SCR SPNL L6MM DIA5. 5MM TI BRK OFF DIAMOND W/ DETACH [facility] - [id number]  SET SCR SPNL L6MM DIA5. 5MM TI BRK OFF DIAMOND W/ DETACH [facility]  MEDTRONIC SOFAMOR DANEK-WD  N/A 8 Implanted   SCREW SPNL L55MM DIA6. 5MM POST THORACOLUMBOSACRAL CO CHROM [id number] MEDTRONIC SOFAMOR Dallis Howe - [id number]  SCREW SPNL L55MM DIA6. 5MM POST THORACOLUMBOSACRAL CO CHROM [id number]] MEDTRONIC SOFAMOR DANEK]  MEDTRONIC SOFAMOR DANEK-WD  N/A 2 Implanted   SCREW SPNL L55MM OD45MM CO [employer] JEREMIAH ANG - [id number]  SCREW SPNL L55MM OD45MM CO CHROME MULTIAXIAL JEREMIAH ANG  MEDTRONIC SOFAMOR DANEK-WD  N/A 1 Implanted         Drains:   Closed/Suction Drain Inferior; Lateral;Right Back Accordion (Active)       Closed/Suction Drain Inferior; Lateral;Left Back Accordion (Active)       Urinary Catheter 08/31/22 (Active)       Findings: Same    Electronically signed by Julio Latham MD on 8/31/2022 at 1:17 PM

## 2022-08-31 NOTE — PROGRESS NOTES
Pt admitted to [facility] room 16 and oriented to unit. SCD sleeves applied. Nares swabbed. Pt verbalized permission for first name, last initial and physicians name on white board. SDS board and discharge criteria explained, pt and family verbalized understanding. Pt denies thoughts of harming self or others. Call light in reach. Family at the bedside.

## 2022-08-31 NOTE — PROGRESS NOTES
Pt presents with a red, raised area on her right outer shin that she states she's had for the past 2 weeks, itching noted. Denies any fever or chills. Stated it has gotten a little better and not worse since it began. Dr Dequan Mascorro notified of this and returned to the bedside with Tom LÓPEZ to discuss in detail the risks of proceeding with surgery due to this area. Tom Mcmullen contacted Dr Joe Ruffin with the above findings. Pt wishes to proceed with surgery. Mother and friend at bedside during this conversation.

## 2022-08-31 NOTE — PROGRESS NOTES
Assist given to ilonel due to drain not staying compressed. Upon assessment, noted 2 holes exposed at back site by sutures. Pt and family x 1 given education on side effects at this time of drain coming out. Informed primary nurse lionel to pull drain now due to being ineffective. [facility] pa notified.

## 2022-08-31 NOTE — ANESTHESIA POSTPROCEDURE EVALUATION
Department of Anesthesiology  Postprocedure Note    Patient: Homa Oleary  MRN: [id number]  YOB: 1985  Date of evaluation: 8/31/2022      Procedure Summary     Date: 08/31/22 Room / Location: [address] [place]    Anesthesia Start: 4535 Anesthesia Stop: 0507    Procedure: REMOVAL OF HARDWARE L4-S1, LUMBAR LAMINECTOMY PLIF L3-L4, PSF L3-S1 WITH SURGALIGN (Spine Lumbar) Diagnosis:       Ruptured lumbar intervertebral disc      (Ruptured lumbar intervertebral disc [M51.26])    Surgeons: Janice Orellana MD Responsible Provider: Philipp Catalan DO    Anesthesia Type: general ASA Status: 2          Anesthesia Type: No value filed.     Krystal Phase I: Krystal Score: 10    Krystal Phase II:        Anesthesia Post Evaluation    Patient location during evaluation: PACU  Patient participation: complete - patient participated  Level of consciousness: awake and alert  Pain score: 3  Airway patency: patent  Nausea & Vomiting: no nausea and no vomiting  Complications: no  Cardiovascular status: hemodynamically stable and blood pressure returned to baseline  Respiratory status: spontaneous ventilation, room air and acceptable  Hydration status: stable

## 2022-08-31 NOTE — H&P
I have reviewed the history and physical and examined the patient. I find no relevant changes. I have reviewed with the patient and/or family members, during the preoperative office visit the risks, benefits, and alternatives to the procedure.     Zarina Sweeney MD

## 2022-08-31 NOTE — LETTER
Lady Ortiz accompanied Tripp Cruz as patient was under our care from 8/31/2022-9/1/2022. They may return to work on 9/2/2022  If you have any questions or concerns, please don't hesitate to call.       Krish Moore

## 2022-09-01 LAB
ANION GAP SERPL CALCULATED.3IONS-SCNC: 8 MEQ/L (ref 8–16)
BASOPHILS # BLD: 0.3 %
BASOPHILS ABSOLUTE: 0 THOU/MM3 (ref 0–0.1)
BUN BLDV-MCNC: 7 MG/DL (ref 7–22)
CALCIUM SERPL-MCNC: 8 MG/DL (ref 8.5–10.5)
CHLORIDE BLD-SCNC: 107 MEQ/L (ref 98–111)
CO2: 25 MEQ/L (ref 23–33)
CREAT SERPL-MCNC: 0.5 MG/DL (ref 0.4–1.2)
EOSINOPHIL # BLD: 0.5 %
EOSINOPHILS ABSOLUTE: 0.1 THOU/MM3 (ref 0–0.4)
ERYTHROCYTE [DISTWIDTH] IN BLOOD BY AUTOMATED COUNT: 13.5 % (ref 11.5–14.5)
ERYTHROCYTE [DISTWIDTH] IN BLOOD BY AUTOMATED COUNT: 47 FL (ref 35–45)
GFR SERPL CREATININE-BSD FRML MDRD: > 90 ML/MIN/1.73M2
GLUCOSE BLD-MCNC: 135 MG/DL (ref 70–108)
HCT VFR BLD CALC: 33 % (ref 37–47)
HEMOGLOBIN: 10.6 GM/DL (ref 12–16)
IMMATURE GRANS (ABS): 0.06 THOU/MM3 (ref 0–0.07)
IMMATURE GRANULOCYTES: 0.4 %
LYMPHOCYTES # BLD: 18.3 %
LYMPHOCYTES ABSOLUTE: 2.8 THOU/MM3 (ref 1–4.8)
MCH RBC QN AUTO: 30.7 PG (ref 26–33)
MCHC RBC AUTO-ENTMCNC: 32.1 GM/DL (ref 32.2–35.5)
MCV RBC AUTO: 95.7 FL (ref 81–99)
MONOCYTES # BLD: 5.8 %
MONOCYTES ABSOLUTE: 0.9 THOU/MM3 (ref 0.4–1.3)
NUCLEATED RED BLOOD CELLS: 0 /100 WBC
PLATELET # BLD: 176 THOU/MM3 (ref 130–400)
PMV BLD AUTO: 11.1 FL (ref 9.4–12.4)
POTASSIUM SERPL-SCNC: 4 MEQ/L (ref 3.5–5.2)
RBC # BLD: 3.45 MILL/MM3 (ref 4.2–5.4)
SEG NEUTROPHILS: 74.7 %
SEGMENTED NEUTROPHILS ABSOLUTE COUNT: 11.3 THOU/MM3 (ref 1.8–7.7)
SODIUM BLD-SCNC: 140 MEQ/L (ref 135–145)
WBC # BLD: 15.1 THOU/MM3 (ref 4.8–10.8)

## 2022-09-01 PROCEDURE — 36415 COLL VENOUS BLD VENIPUNCTURE: CPT

## 2022-09-01 PROCEDURE — 2580000003 HC RX 258: Performed by: PHYSICIAN ASSISTANT

## 2022-09-01 PROCEDURE — 6360000002 HC RX W HCPCS: Performed by: PHYSICIAN ASSISTANT

## 2022-09-01 PROCEDURE — 85025 COMPLETE CBC W/AUTO DIFF WBC: CPT

## 2022-09-01 PROCEDURE — 80048 BASIC METABOLIC PNL TOTAL CA: CPT

## 2022-09-01 PROCEDURE — 1200000000 HC SEMI PRIVATE

## 2022-09-01 PROCEDURE — 6370000000 HC RX 637 (ALT 250 FOR IP): Performed by: INTERNAL MEDICINE

## 2022-09-01 PROCEDURE — 6370000000 HC RX 637 (ALT 250 FOR IP): Performed by: PHYSICIAN ASSISTANT

## 2022-09-01 RX ORDER — NICOTINE 21 MG/24HR
1 PATCH, TRANSDERMAL 24 HOURS TRANSDERMAL DAILY
Status: DISCONTINUED | OUTPATIENT
Start: 2022-09-01 | End: 2022-09-06 | Stop reason: HOSPADM

## 2022-09-01 RX ADMIN — OXYCODONE AND ACETAMINOPHEN 2 TABLET: 5; 325 TABLET ORAL at 04:18

## 2022-09-01 RX ADMIN — CYCLOBENZAPRINE 10 MG: 10 TABLET, FILM COATED ORAL at 21:36

## 2022-09-01 RX ADMIN — BUSPIRONE HYDROCHLORIDE 5 MG: 5 TABLET ORAL at 19:54

## 2022-09-01 RX ADMIN — SODIUM CHLORIDE: 9 INJECTION, SOLUTION INTRAVENOUS at 12:58

## 2022-09-01 RX ADMIN — SODIUM CHLORIDE: 9 INJECTION, SOLUTION INTRAVENOUS at 22:40

## 2022-09-01 RX ADMIN — BUSPIRONE HYDROCHLORIDE 5 MG: 5 TABLET ORAL at 07:55

## 2022-09-01 RX ADMIN — CYCLOBENZAPRINE 10 MG: 10 TABLET, FILM COATED ORAL at 13:04

## 2022-09-01 RX ADMIN — HYDROMORPHONE HYDROCHLORIDE 0.5 MG: 1 INJECTION, SOLUTION INTRAMUSCULAR; INTRAVENOUS; SUBCUTANEOUS at 21:36

## 2022-09-01 RX ADMIN — SODIUM CHLORIDE: 9 INJECTION, SOLUTION INTRAVENOUS at 02:18

## 2022-09-01 RX ADMIN — OXYCODONE AND ACETAMINOPHEN 2 TABLET: 5; 325 TABLET ORAL at 08:47

## 2022-09-01 RX ADMIN — CLOTRIMAZOLE: 0.01 CREAM TOPICAL at 19:55

## 2022-09-01 RX ADMIN — OXYCODONE AND ACETAMINOPHEN 2 TABLET: 5; 325 TABLET ORAL at 15:07

## 2022-09-01 RX ADMIN — SENNOSIDES AND DOCUSATE SODIUM 1 TABLET: 50; 8.6 TABLET ORAL at 07:55

## 2022-09-01 RX ADMIN — POLYETHYLENE GLYCOL 3350 17 G: 17 POWDER, FOR SOLUTION ORAL at 07:55

## 2022-09-01 RX ADMIN — BISACODYL 5 MG: 5 TABLET, COATED ORAL at 07:55

## 2022-09-01 RX ADMIN — CEFAZOLIN 2000 MG: 10 INJECTION, POWDER, FOR SOLUTION INTRAVENOUS at 02:22

## 2022-09-01 RX ADMIN — SENNOSIDES AND DOCUSATE SODIUM 1 TABLET: 50; 8.6 TABLET ORAL at 19:55

## 2022-09-01 RX ADMIN — HYDROMORPHONE HYDROCHLORIDE 0.25 MG: 1 INJECTION, SOLUTION INTRAMUSCULAR; INTRAVENOUS; SUBCUTANEOUS at 06:32

## 2022-09-01 RX ADMIN — CLOTRIMAZOLE: 0.01 CREAM TOPICAL at 07:55

## 2022-09-01 RX ADMIN — NALOXEGOL OXALATE 12.5 MG: 12.5 TABLET, FILM COATED ORAL at 05:05

## 2022-09-01 RX ADMIN — OXYCODONE AND ACETAMINOPHEN 2 TABLET: 5; 325 TABLET ORAL at 19:55

## 2022-09-01 ASSESSMENT — PAIN SCALES - GENERAL
PAINLEVEL_OUTOF10: 8
PAINLEVEL_OUTOF10: 4
PAINLEVEL_OUTOF10: 8

## 2022-09-01 ASSESSMENT — PAIN DESCRIPTION - DESCRIPTORS
DESCRIPTORS: ACHING

## 2022-09-01 ASSESSMENT — PAIN DESCRIPTION - PAIN TYPE
TYPE: SURGICAL PAIN
TYPE: ACUTE PAIN;SURGICAL PAIN
TYPE: SURGICAL PAIN

## 2022-09-01 ASSESSMENT — PAIN DESCRIPTION - ORIENTATION
ORIENTATION: MID;LOWER

## 2022-09-01 ASSESSMENT — PAIN DESCRIPTION - LOCATION
LOCATION: BACK

## 2022-09-01 ASSESSMENT — PAIN DESCRIPTION - FREQUENCY
FREQUENCY: INTERMITTENT
FREQUENCY: CONTINUOUS
FREQUENCY: INTERMITTENT

## 2022-09-01 ASSESSMENT — PAIN DESCRIPTION - ONSET
ONSET: ON-GOING

## 2022-09-01 ASSESSMENT — PAIN - FUNCTIONAL ASSESSMENT
PAIN_FUNCTIONAL_ASSESSMENT: ACTIVITIES ARE NOT PREVENTED
PAIN_FUNCTIONAL_ASSESSMENT: PREVENTS OR INTERFERES SOME ACTIVE ACTIVITIES AND ADLS
PAIN_FUNCTIONAL_ASSESSMENT: PREVENTS OR INTERFERES SOME ACTIVE ACTIVITIES AND ADLS

## 2022-09-01 NOTE — CARE COORDINATION
9/1/22, 8:05 AM EDT  DISCHARGE PLANNING EVALUATION:    Ana Lilia Serrano       Admitted: 8/31/2022/ [facility] day: 1   Location: [address] [place] [address]- Reason for admit: Ruptured lumbar intervertebral disc [M51.26]  Spinal stenosis of lumbar region with radiculopathy [M48.061, M54.16]   PMH:  has a past medical history of Anxiety, Blood clotting disorder ([facility] [address] Depression, Heterozygous hemoglobin S ([facility] [address] Hx of blood clots, Laceration, PONV (postoperative nausea and vomiting), and Unspecified diseases of blood and blood-forming organs. Procedure: 8/31  REMOVAL OF HARDWARE L4-S1, LUMBAR LAMINECTOMY PLIF L3-L4, PSF L3-S1 WITH SURGALIGN by    mL  Barriers to Discharge:  POD 1. Monitor drain output x1. 50% compression to drain. Dangle at [facility] if no HA today, guerra catheter, strict I/O. Pain control. /hr, po Dulcolax, hold therapy for today. Incision care. New PCP: Ever Candelario CNP    Readmission Risk Score: 10.3%  Patient's Healthcare Decision Maker: Legal Next of Kin    Patient Goals/Plan/Treatment Preferences: Visited with Meenakshi Griffitht this morning along with her mother Yuliana Valenzuela. She lives home, was independent pta, works outside home, uses no DME. She had back surgery and is aware she will need HH for drain mgmt and removal.  [facility] chose [facility]; referral [facility] to [facility].    1135 am- [facility] has concern pt has no PCP; discussed with pt she needs to establish care with PCP in case she has other complications/concerns. Patient agrees to see Ever Candelario CNP in [place]. Appt made for new PCP Damian Agarwal CNP for Tuesday 9/6 at 1PM.        Transportation/Food Security/Housekeeping Addressed:  No issues identified.

## 2022-09-01 NOTE — PLAN OF CARE
Problem: Discharge Planning  Goal: Discharge to home or other facility with appropriate resources  9/1/2022 0359 by Rohan Pantoja RN  Outcome: Progressing  Flowsheets (Taken 9/1/2022 0359)  Discharge to home or other facility with appropriate resources:   Identify barriers to discharge with patient and caregiver   Arrange for needed discharge resources and transportation as appropriate  9/1/2022 0358 by Rohan Pantoja RN  Outcome: Progressing  Flowsheets  Taken 9/1/2022 0358 by Rohan Pantoja RN  Discharge to home or other facility with appropriate resources:   Identify barriers to discharge with patient and caregiver   Arrange for needed discharge resources and transportation as appropriate  Taken 8/31/2022 2012 by Heidi Rebolledo RN  Discharge to home or other facility with appropriate resources: Identify barriers to discharge with patient and caregiver     Problem: ABCDS Injury Assessment  Goal: Absence of physical injury  9/1/2022 0359 by Rohan Pantoja RN  Outcome: Progressing  Flowsheets (Taken 9/1/2022 0359)  Absence of Physical Injury: Implement safety measures based on patient assessment  9/1/2022 0358 by Rohan Pantoja RN  Outcome: Progressing  Flowsheets (Taken 9/1/2022 0358)  Absence of Physical Injury: Implement safety measures based on patient assessment     Problem: Pain  Goal: Verbalizes/displays adequate comfort level or baseline comfort level  9/1/2022 0359 by Rohan Pantoja RN  Outcome: Progressing  Flowsheets (Taken 9/1/2022 0359)  Verbalizes/displays adequate comfort level or baseline comfort level:   Encourage patient to monitor pain and request assistance   Assess pain using appropriate pain scale  9/1/2022 0358 by Rohan Pantoja RN  Outcome: Progressing  Flowsheets (Taken 9/1/2022 0358)  Verbalizes/displays adequate comfort level or baseline comfort level:   Encourage patient to monitor pain and request assistance   Assess pain using appropriate pain scale     Problem: Skin/Tissue Integrity  Goal: Absence of new skin breakdown  Description: 1. Monitor for areas of redness and/or skin breakdown  2. Assess vascular access sites hourly  3. Every 4-6 hours minimum:  Change oxygen saturation probe site  4. Every 4-6 hours:  If on nasal continuous positive airway pressure, respiratory therapy assess nares and determine need for appliance change or resting period. 9/1/2022 0359 by Ne Farmer RN  Outcome: Progressing  Note: No new skin breakdown noted this shift. Patient able to reposition self. Education on the importance of turning and repositioning was given. Patient verbalizes understanding. Will monitor. 9/1/2022 0358 by Ne Farmer RN  Outcome: Progressing  Note: No new skin breakdown noted this shift. Patient able to reposition self. Education on the importance of turning and repositioning was given. Patient verbalizes understanding. Will monitor. Problem: Skin/Tissue Integrity - Adult  Goal: Skin integrity remains intact  Outcome: Progressing  Flowsheets (Taken 9/1/2022 0359)  Skin Integrity Remains Intact: Monitor for areas of redness and/or skin breakdown     Problem: Genitourinary - Adult  Goal: Absence of urinary retention  Outcome: Progressing  Flowsheets (Taken 9/1/2022 0359)  Absence of urinary retention:   Assess patients ability to void and empty bladder   Monitor intake/output and perform bladder scan as needed     Problem: Infection - Adult  Goal: Absence of infection at discharge  Outcome: Progressing  Flowsheets (Taken 9/1/2022 0359)  Absence of infection at discharge:   Assess and monitor for signs and symptoms of infection   Monitor lab/diagnostic results    Care plan reviewed with patient. Patient verbalizes understanding of the plan of care and contribute to goal setting.

## 2022-09-01 NOTE — PLAN OF CARE
Problem: Discharge Planning  Goal: Discharge to home or other facility with appropriate resources  9/1/2022 1340 by Chris Daniels RN  Outcome: Progressing  Flowsheets (Taken 9/1/2022 1339)  Discharge to home or other facility with appropriate resources:   Identify barriers to discharge with patient and caregiver   Identify discharge learning needs (meds, wound care, etc)  Note: Plans to be discharged at home with home health. Problem: ABCDS Injury Assessment  Goal: Absence of physical injury  9/1/2022 1340 by Chris Daniels RN  Outcome: Progressing  Flowsheets (Taken 9/1/2022 1340)  Absence of Physical Injury: Implement safety measures based on patient assessment  Note: Patient up with staff assistance. Able to use call light. Patient has remained free of falls during this shift. Appropriate fall prevention measures in place. Problem: Pain  Goal: Verbalizes/displays adequate comfort level or baseline comfort level  9/1/2022 1340 by Chris Daniels RN  Outcome: Progressing  Flowsheets (Taken 9/1/2022 1340)  Verbalizes/displays adequate comfort level or baseline comfort level:   Encourage patient to monitor pain and request assistance   Administer analgesics based on type and severity of pain and evaluate response   Assess pain using appropriate pain scale   Implement non-pharmacological measures as appropriate and evaluate response  Note: Patient taking pain medication on MAR as needed to control pain. Use of non-pharmacologic pain management including ice/repositioning. Patient pain goal is 2. Problem: Skin/Tissue Integrity  Goal: Absence of new skin breakdown  Description: 1. Monitor for areas of redness and/or skin breakdown  2. Assess vascular access sites hourly  3. Every 4-6 hours minimum:  Change oxygen saturation probe site  4.   Every 4-6 hours:  If on nasal continuous positive airway pressure, respiratory therapy assess nares and determine need for appliance change or resting period. 9/1/2022 1340 by Loretta Cueva RN  Outcome: Progressing  Note: Assess skin every 4 hours. Problem: Skin/Tissue Integrity - Adult  Goal: Skin integrity remains intact  9/1/2022 1340 by Loretta Cueva RN  Outcome: Progressing  Flowsheets (Taken 9/1/2022 1340)  Skin Integrity Remains Intact: Monitor for areas of redness and/or skin breakdown  Note: Assess skin every 4 hours. Care plan reviewed with patient. Patient verbalize understanding of the plan of care and contribute to goal setting.

## 2022-09-01 NOTE — PROGRESS NOTES
Department of Orthopedic Surgery  Spine Service  [place], [place] Progress Note        Subjective:    9/1/22  Yoly Núñez is resting in bed. Doing ok this AM. Back pain as expected. No HA. 1 drain pulled out last night. Drainage is low and dark output. Vitals  VITALS:  BP 98/61   Pulse 53   Temp 98.5 °F (36.9 °C) (Oral)   Resp 18   Ht 5' 9\" (1.753 m)   Wt 178 lb 3.2 oz (80.8 kg)   LMP  (LMP Unknown)   SpO2 98%   BMI 26.32 kg/m²   24HR INTAKE/OUTPUT:    Intake/Output Summary (Last 24 hours) at 9/1/2022 0705  Last data filed at 9/1/2022 0346  Gross per 24 hour   Intake 1660 ml   Output 2660 ml   Net -1000 ml     URINARY CATHETER OUTPUT (Brasher):  Urinary Catheter 08/31/22-Output (mL): 1750 mL  DRAIN/TUBE OUTPUT:  Closed/Suction Drain Inferior; Lateral;Right Back Accordion-Output (ml): 70 ml      PHYSICAL EXAM:    Orientation:  alert and oriented to person, place and time    Incision:  dressing in place, clean, dry, intact    Lower Extremity Motor :  quadriceps, extensor hallucis longus, dorsiflexion, plantarflexion 5/5 bilaterally  Lower Extremity Sensory:  Intact L1-S1    Flatus:  negative    ABNORMAL EXAM FINDINGS:  none    LABS:    HgB:    Lab Results   Component Value Date/Time    HGB 10.6 09/01/2022 04:47 AM         ASSESSMENT AND PLAN:    Post operative day 1     1:  Monitor labs and drain output  2:  Activity Level:  OK to dangle today if no HA. Slow progress with activity. Hold PT for now.    3:  Pain Control:  Controlled  4:  Discharge Planning:  Awaiting advance of activity

## 2022-09-01 NOTE — OP NOTE
[address] [place] [address]                                OPERATIVE REPORT    PATIENT NAME: Alyssa Conn                      :        1985  MED REC NO:   [id number]                           ROOM:       0025  ACCOUNT NO:   [de-identified]                           ADMIT DATE: 2022  PROVIDER:     Rosanna Pruitt. Solis Delatorre M.D.    Vj Ott:  2022    PREOPERATIVE DIAGNOSES:  1. Lumbar spinal stenosis at the L3-L4 level with retrolisthesis. 2.  Status post previous L4 through S1 laminectomy and fusion with L5-S1      PLIF from previous date of surgery of year .  3.  Right lower extremity radiculopathy. POSTOPERATIVE DIAGNOSES:  1. Lumbar spinal stenosis at the L3-L4 level with retrolisthesis. 2.  Status post previous L4 through S1 laminectomy and fusion with L5-S1      PLIF from previous date of surgery of year .  3.  Right lower extremity radiculopathy. OPERATIONS PERFORMED:  1. Removal of hardware including pedicle screw, setscrew cap, and      bridging alex over levels of L4 through S1.  2.  The assessment of fusion of levels of L4 through S1 with finding of      solid bony union. 3.  L3-L4 posterior lumbar interbody fusion and a bilateral fusion at      the L3-L4 level. 4.  Foraminotomy of L3-L4 bilateral.  5.  Use of one Medtronic expandable cage of a 9 x 14 mm expanded height      placed through a left hand side L3-L4 annulotomy. 6.  Use of the Stearns DBM 2 x 10 cm kit mixed with a 5 mL kit of the      Stearns DBM Matrix. 7.  Use of all local bone cleaned of soft tissue for use in this lumbar      spine fusion and run through bone mill. 8.  L5 and S1 bilateral fusion levels of L3-L4.  9.   Instrumentation of lumbar spine L3, L4, L5, and S1 with use of the      Medtronic Solera pedicle screw fixation system placed bilateral and      segmental.    SURGEON:  Maycol Strange MD    ASSISTANT:  Teri Denney Harper Naik PA-C    ANESTHESIA:  General.    BLOOD LOSS:  400 mL. DRAIN:  Hemovac. COMPLICATIONS:  Durotomy, posterior thecal sac level of L4. INDICATION:  The patient presents, 40years of age, having undergone  surgery six years ago which included a lumbar spine decompressive  laminectomy and fusion. Her adjacent level of L3-L4 shows a  retrolisthesis and stenosis at the L3-L4 level with a broad-based disk  herniation. I have offered the option of surgery for definitive relief  to relieve this neurocompression caused by the stenosis at the L3-L4  level. This would include the adjacent level decompressive laminectomy  with facetectomy and foraminotomy. We would include a decompression of  the L3-L4 level. We would include fusion of the L3 level coming up from  levels of L4 through S1 adding this to previous fusion. We discussed  the technique and the recovery for this operation in preparation for  surgery. Medical clearance was obtained as well. In the preop holding area, we appreciated that the patient has a small  quarter size area over the right anterior shin which she states could  have been caused by a bug bite. We consulted Dr. Carrie Husain. He has seen  an image of the wound and feels it is safe to proceed believing this is  a ringworm which he will treat as an outpatient. DESCRIPTION OF PROCEDURE:  We brought the patient to the operating room  and upon entry, timeout would be observed. Her anesthetic was  delivered, airways secured, Brasher catheter placed, and turned prone to a  Ritesh frame table for appropriate bodily padding in the prone  position. A thorough prepping and draping would be complete with soap  scrubbed solution, sterile toweling, and ChloraPrep solution as well. We applied sterile sheaths, [place] as well and the Brasher catheter hung  freely. Intravenous Ancef was infused to completion. The skin was  marked at the L3 through L4 level and down through S1.   I would inject  10 mL of 1% lidocaine with epinephrine solution and then make skin  incision. IV Ancef was infused to completion of only 2 gm. With  hemostasis maintained, we exposed the lumbar spine subperiosteally  across levels of L4 through S1 and also exposed the lateral facet across  L3 and the transverse process of L3 bilateral.  I then backed out the  setscrew caps and bridging rods over levels of L4 through S1. Through  scoring and manipulation technique, we found a very solid fusion  bilaterally across levels of L4 through S1 with no evidence of any  looseness of hardware or evidence of nonunion. We could then set aside  the retractor and begin the decompression that would include laminectomy  over levels of L3-L4 with decompression of the foramen, recess, and  canal with a wide facetectomy. This would allow us to remove the  superior process facet at L4 for a wide decompression. In the course of  this decompression, we would save all bone from the decompression as  well and relieve all the pressure at the recess througha wide  foraminotomy. As the canal was clear at the upper portion of the L3  limit, we did encounter durotomy with a linear tearing of the thecal sac  that required two 4-0 figure-of-eight Nurolon sutures which would bring  this together very well. Without any evidence of any persistent CSF  leak, this came together well and we were able to obtain a watertight  closure. With the canal very thoroughly decompressed and with the CSF  repair complete, a Valsalva maneuver would be performed and confirmed no  evidence of any persistent CSF leakage.   Gentle retraction of the thecal  sac at the L3-L4 level would then allow us to expose the disk of L3-L4  and perform disk removal through annulotomies and go on to relieve the  neurocompression through the recess at L3-L4 bilateral.  We used this  annulotomy to then prepare the interbody surface for fusion, preparing  the interbody surface with curettage, nelson, and Keradrienne zhouur use as  well as the jj from the [employer] PMI system. Within this  interbody surface, we then trialed the expandable cage and found that  the optimum size with a 9-mm insertion height expanded to 14 mm, but  placed to a 20-mm depth. We used a radiographic C-arm to confirm the  position of the cage as this was inserted and this would fit very well. It was expanded to maximum height of 14 mm. We then removed the  insertion device and would open the pedicles of L3 bilateral and then  perform cannulation and threaded tapping as well. With the cannulation  complete, we then could tap the holes of the pedicles of L3 bilateral  and then decorticate the L3 transverse process posteriorly as well as  the fusion mass over levels of L4 through S1 bilateral.  Our fusion bed  was well prepared, the 6.5 x 55 mm screw was then placed bilaterally in  levels of L3 and we confirmed their position with a C-arm. We tested  thresholds that would measure better than 20 mA bilateral and two rods  were then selected. 100-mm rods were chosen, one per side and four  setscrew caps per side as well connecting the rods to the screws over  levels of L3 through S1. Four setscrew caps were used as well. I did  exchange the left L4 pedicle screw to a 7.5 x 45 mm screw as we wanted to  seat the screw more deeply to contour the lordosis of the lumbar spine. Four set screw caps were used, broken off at the appropriate torque and  we then irrigated thoroughly and suctioned dry. X-rays were reviewed. Bone was applied including local bone mixed with a [place] DBM as well  as the DBM putty by Ana to span the posterior structures over levels  of L3 through L5 and S1 bilateral for fusion. We then backed out the  retractors, placed two drains, and closed in layers.   My first assistant  was Ramin Hill as well and we would also apply some of the thrombin,  DuraSeal, and Fibrillar along the repaired dural site of [id number] Mayes, assisted throughout the procedure with positioning,  draping, retraction, wound closure, dressing, and splint application.         Naya Lobato M.D.    D: 08/31/2022 13:39:51       T: 08/31/2022 14:57:12     [email or web address]  Job#: [id number]     Doc#: [id number]    CC:

## 2022-09-02 LAB
BASOPHILS # BLD: 0.7 %
BASOPHILS ABSOLUTE: 0.1 THOU/MM3 (ref 0–0.1)
EOSINOPHIL # BLD: 2.1 %
EOSINOPHILS ABSOLUTE: 0.2 THOU/MM3 (ref 0–0.4)
ERYTHROCYTE [DISTWIDTH] IN BLOOD BY AUTOMATED COUNT: 13.6 % (ref 11.5–14.5)
ERYTHROCYTE [DISTWIDTH] IN BLOOD BY AUTOMATED COUNT: 48.7 FL (ref 35–45)
HCT VFR BLD CALC: 31.1 % (ref 37–47)
HEMOGLOBIN: 10 GM/DL (ref 12–16)
IMMATURE GRANS (ABS): 0.03 THOU/MM3 (ref 0–0.07)
IMMATURE GRANULOCYTES: 0.4 %
LYMPHOCYTES # BLD: 30.1 %
LYMPHOCYTES ABSOLUTE: 2.3 THOU/MM3 (ref 1–4.8)
MCH RBC QN AUTO: 31.2 PG (ref 26–33)
MCHC RBC AUTO-ENTMCNC: 32.2 GM/DL (ref 32.2–35.5)
MCV RBC AUTO: 96.9 FL (ref 81–99)
MONOCYTES # BLD: 9.2 %
MONOCYTES ABSOLUTE: 0.7 THOU/MM3 (ref 0.4–1.3)
NUCLEATED RED BLOOD CELLS: 0 /100 WBC
PLATELET # BLD: 144 THOU/MM3 (ref 130–400)
PMV BLD AUTO: 11 FL (ref 9.4–12.4)
RBC # BLD: 3.21 MILL/MM3 (ref 4.2–5.4)
SEG NEUTROPHILS: 57.5 %
SEGMENTED NEUTROPHILS ABSOLUTE COUNT: 4.3 THOU/MM3 (ref 1.8–7.7)
WBC # BLD: 7.5 THOU/MM3 (ref 4.8–10.8)

## 2022-09-02 PROCEDURE — 6370000000 HC RX 637 (ALT 250 FOR IP): Performed by: PHYSICIAN ASSISTANT

## 2022-09-02 PROCEDURE — 6360000002 HC RX W HCPCS: Performed by: PHYSICIAN ASSISTANT

## 2022-09-02 PROCEDURE — 36415 COLL VENOUS BLD VENIPUNCTURE: CPT

## 2022-09-02 PROCEDURE — 2580000003 HC RX 258: Performed by: PHYSICIAN ASSISTANT

## 2022-09-02 PROCEDURE — 6370000000 HC RX 637 (ALT 250 FOR IP): Performed by: INTERNAL MEDICINE

## 2022-09-02 PROCEDURE — 1200000000 HC SEMI PRIVATE

## 2022-09-02 PROCEDURE — 51798 US URINE CAPACITY MEASURE: CPT

## 2022-09-02 PROCEDURE — 85025 COMPLETE CBC W/AUTO DIFF WBC: CPT

## 2022-09-02 RX ORDER — PROMETHAZINE HYDROCHLORIDE 25 MG/ML
6.25 INJECTION, SOLUTION INTRAMUSCULAR; INTRAVENOUS EVERY 4 HOURS PRN
Status: DISCONTINUED | OUTPATIENT
Start: 2022-09-02 | End: 2022-09-06 | Stop reason: HOSPADM

## 2022-09-02 RX ORDER — HYDROXYZINE PAMOATE 25 MG/1
25 CAPSULE ORAL 4 TIMES DAILY PRN
Status: DISCONTINUED | OUTPATIENT
Start: 2022-09-02 | End: 2022-09-06 | Stop reason: HOSPADM

## 2022-09-02 RX ADMIN — HYDROMORPHONE HYDROCHLORIDE 0.5 MG: 1 INJECTION, SOLUTION INTRAMUSCULAR; INTRAVENOUS; SUBCUTANEOUS at 04:31

## 2022-09-02 RX ADMIN — CLOTRIMAZOLE: 0.01 CREAM TOPICAL at 09:12

## 2022-09-02 RX ADMIN — SODIUM CHLORIDE: 9 INJECTION, SOLUTION INTRAVENOUS at 08:36

## 2022-09-02 RX ADMIN — BUSPIRONE HYDROCHLORIDE 5 MG: 5 TABLET ORAL at 09:13

## 2022-09-02 RX ADMIN — OXYCODONE AND ACETAMINOPHEN 2 TABLET: 5; 325 TABLET ORAL at 05:35

## 2022-09-02 RX ADMIN — HYDROXYZINE PAMOATE 25 MG: 25 CAPSULE ORAL at 09:12

## 2022-09-02 RX ADMIN — OXYCODONE AND ACETAMINOPHEN 2 TABLET: 5; 325 TABLET ORAL at 17:15

## 2022-09-02 RX ADMIN — CLOTRIMAZOLE: 0.01 CREAM TOPICAL at 21:26

## 2022-09-02 RX ADMIN — OXYCODONE AND ACETAMINOPHEN 2 TABLET: 5; 325 TABLET ORAL at 12:42

## 2022-09-02 RX ADMIN — BISACODYL 5 MG: 5 TABLET, COATED ORAL at 09:12

## 2022-09-02 RX ADMIN — SENNOSIDES AND DOCUSATE SODIUM 1 TABLET: 50; 8.6 TABLET ORAL at 09:12

## 2022-09-02 RX ADMIN — OXYCODONE AND ACETAMINOPHEN 2 TABLET: 5; 325 TABLET ORAL at 21:37

## 2022-09-02 RX ADMIN — NALOXEGOL OXALATE 12.5 MG: 12.5 TABLET, FILM COATED ORAL at 06:17

## 2022-09-02 RX ADMIN — SENNOSIDES AND DOCUSATE SODIUM 1 TABLET: 50; 8.6 TABLET ORAL at 21:26

## 2022-09-02 RX ADMIN — CYCLOBENZAPRINE 10 MG: 10 TABLET, FILM COATED ORAL at 05:35

## 2022-09-02 RX ADMIN — PROMETHAZINE HYDROCHLORIDE 6.25 MG: 25 INJECTION INTRAMUSCULAR; INTRAVENOUS at 09:07

## 2022-09-02 RX ADMIN — HYDROXYZINE PAMOATE 25 MG: 25 CAPSULE ORAL at 14:56

## 2022-09-02 RX ADMIN — BUSPIRONE HYDROCHLORIDE 5 MG: 5 TABLET ORAL at 21:26

## 2022-09-02 RX ADMIN — SODIUM CHLORIDE, PRESERVATIVE FREE 10 ML: 5 INJECTION INTRAVENOUS at 21:26

## 2022-09-02 RX ADMIN — OXYCODONE AND ACETAMINOPHEN 2 TABLET: 5; 325 TABLET ORAL at 00:44

## 2022-09-02 RX ADMIN — POLYETHYLENE GLYCOL 3350 17 G: 17 POWDER, FOR SOLUTION ORAL at 09:12

## 2022-09-02 ASSESSMENT — PAIN SCALES - GENERAL
PAINLEVEL_OUTOF10: 8
PAINLEVEL_OUTOF10: 7
PAINLEVEL_OUTOF10: 8
PAINLEVEL_OUTOF10: 8
PAINLEVEL_OUTOF10: 6
PAINLEVEL_OUTOF10: 8
PAINLEVEL_OUTOF10: 7
PAINLEVEL_OUTOF10: 8
PAINLEVEL_OUTOF10: 8

## 2022-09-02 ASSESSMENT — PAIN DESCRIPTION - ORIENTATION
ORIENTATION: ANTERIOR
ORIENTATION: LOWER
ORIENTATION: MID;LOWER
ORIENTATION: LOWER
ORIENTATION: MID;LOWER

## 2022-09-02 ASSESSMENT — PAIN DESCRIPTION - ONSET
ONSET: ON-GOING

## 2022-09-02 ASSESSMENT — PAIN DESCRIPTION - DESCRIPTORS
DESCRIPTORS: THROBBING

## 2022-09-02 ASSESSMENT — PAIN DESCRIPTION - LOCATION
LOCATION: BACK
LOCATION: HEAD
LOCATION: BACK

## 2022-09-02 ASSESSMENT — PAIN DESCRIPTION - PAIN TYPE
TYPE: ACUTE PAIN
TYPE: SURGICAL PAIN
TYPE: ACUTE PAIN;SURGICAL PAIN

## 2022-09-02 ASSESSMENT — PAIN - FUNCTIONAL ASSESSMENT
PAIN_FUNCTIONAL_ASSESSMENT: PREVENTS OR INTERFERES SOME ACTIVE ACTIVITIES AND ADLS

## 2022-09-02 ASSESSMENT — PAIN DESCRIPTION - FREQUENCY
FREQUENCY: INTERMITTENT

## 2022-09-02 NOTE — PROGRESS NOTES
Received a message nursing staff for patient having a HA. Return back to bedrest and on sides. Ok for 30 degrees for meals. Ok to place order for Phenergan IM for nausea.

## 2022-09-02 NOTE — PLAN OF CARE
Problem: Discharge Planning  Goal: Discharge to home or other facility with appropriate resources  Outcome: Progressing  Flowsheets (Taken 9/2/2022 1440)  Discharge to home or other facility with appropriate resources:   Identify barriers to discharge with patient and caregiver   Identify discharge learning needs (meds, wound care, etc)  Note: Discharge planning still in progress. Problem: ABCDS Injury Assessment  Goal: Absence of physical injury  Outcome: Progressing  Flowsheets (Taken 9/2/2022 1440)  Absence of Physical Injury: Implement safety measures based on patient assessment  Note: Patient up with staff assistance. Able to use call light. Patient has remained free of falls during this shift. Appropriate fall prevention measures in place. Problem: Pain  Goal: Verbalizes/displays adequate comfort level or baseline comfort level  Outcome: Progressing  Flowsheets (Taken 9/2/2022 1440)  Verbalizes/displays adequate comfort level or baseline comfort level:   Encourage patient to monitor pain and request assistance   Administer analgesics based on type and severity of pain and evaluate response   Assess pain using appropriate pain scale   Implement non-pharmacological measures as appropriate and evaluate response  Note: Patient taking pain medication on MAR as needed to control pain. Use of non-pharmacologic pain management including ice/repositioning. Patient pain goal is 3. Problem: Skin/Tissue Integrity - Adult  Goal: Skin integrity remains intact  Outcome: Progressing  Flowsheets (Taken 9/2/2022 1440)  Skin Integrity Remains Intact: Monitor for areas of redness and/or skin breakdown  Note: Assess skin every 4 hours.      Problem: Infection - Adult  Goal: Absence of infection at discharge  Outcome: Progressing  Flowsheets (Taken 9/2/2022 1440)  Absence of infection at discharge:   Assess and monitor for signs and symptoms of infection   Monitor lab/diagnostic results     Problem: Safety - Adult  Goal: Free from fall injury  Outcome: Progressing  Flowsheets (Taken 9/2/2022 1440)  Free From Fall Injury: Instruct family/caregiver on patient safety  Note: Patient up with staff assistance. Able to use call light. Patient has remained free of falls during this shift. Appropriate fall prevention measures in place. Care plan reviewed with patient. Patient verbalize understanding of the plan of care and contribute to goal setting.

## 2022-09-02 NOTE — PROGRESS NOTES
Component Value Date/Time    FERRITIN 95 04/05/2013 07:10 PM           Assessment and Plan:     Ruptured lumbar intervertebral disc [M51.26]  S/p lumbar laminectomy, PLIFL3-4, PSF L3-S1  VAZQUEZ    Plan:  Cont post op care. SCD.   Bowel regimen        Kike Gonzalez MD, MD

## 2022-09-02 NOTE — CARE COORDINATION
9/2/22, 2:52 PM EDT    DISCHARGE ON GOING [facility] day: 2  Location: -25/025-A Reason for admit: Ruptured lumbar intervertebral disc [M51.26]  Spinal stenosis of lumbar region with radiculopathy [M48.061, M54.16]   Procedure:    8/31  REMOVAL OF HARDWARE L4-S1, LUMBAR LAMINECTOMY PLIF L3-L4, PSF L3-S1 WITH SURGALIGN by    mL  Barriers to Discharge: Ortho following. POD 2. Drain care. Pain control. Incision care. HA an nausea, remains on bedrest with PRN phenergan. PCP: No primary care provider on file. Readmission Risk Score: 9.6%  Patient Goals/Plan/Treatment Preferences: Plans home with [facility] for drain management. New PCP was arranged by Bam Barrett CM.

## 2022-09-02 NOTE — PROGRESS NOTES
Department of Orthopedic Surgery  Spine Service  Progress Note        Subjective:    9/1/22  Courtney Wilcox is resting in bed. Doing ok this AM. Back pain as expected. No HA. 1 drain pulled out last night. Drainage is low and dark output. 9/2/22  Courtney Wilcox is resting in bed. Denies HA, overall doing well. Surgical pain as expected. Drain output increased and red coloration. Ok to slowly advance activity, HA precautions. Remove Brasher today. Vitals  VITALS:  BP (!) 105/58   Pulse 82   Temp 98.4 °F (36.9 °C) (Oral)   Resp 16   Ht 5' 9\" (1.753 m)   Wt 178 lb 3.2 oz (80.8 kg)   LMP  (LMP Unknown)   SpO2 95%   BMI 26.32 kg/m²   24HR INTAKE/OUTPUT:    Intake/Output Summary (Last 24 hours) at 9/2/2022 0825  Last data filed at 9/2/2022 0434  Gross per 24 hour   Intake 780 ml   Output 3480 ml   Net -2700 ml     URINARY CATHETER OUTPUT (Brasher):  Urinary Catheter 08/31/22-Output (mL): 300 mL  DRAIN/TUBE OUTPUT:  Closed/Suction Drain Inferior; Lateral;Right Back Accordion-Output (ml): 150 ml      PHYSICAL EXAM:    Orientation:  alert and oriented to person, place and time    Incision:  dressing in place, clean, dry, intact    Lower Extremity Motor :  quadriceps, extensor hallucis longus, dorsiflexion, plantarflexion 5/5 bilaterally  Lower Extremity Sensory:  Intact L1-S1    Flatus:  positive    ABNORMAL EXAM FINDINGS:  none    LABS:    HgB:    Lab Results   Component Value Date/Time    HGB 10.0 09/02/2022 04:37 AM         ASSESSMENT AND PLAN:    Post operative day 2    1:  Monitor labs and drain output 50% compression  2:  Activity Level:  HA precautions, Slow progress with activity.  Hold PT for now but ok to ambulate if no HA.   3:  Pain Control:  Controlled  4:  Discharge Planning:  Awaiting advance of activity  5:  Remove Brasher today

## 2022-09-03 LAB
BASOPHILS # BLD: 0.4 %
BASOPHILS ABSOLUTE: 0 THOU/MM3 (ref 0–0.1)
EOSINOPHIL # BLD: 3.5 %
EOSINOPHILS ABSOLUTE: 0.3 THOU/MM3 (ref 0–0.4)
ERYTHROCYTE [DISTWIDTH] IN BLOOD BY AUTOMATED COUNT: 13.4 % (ref 11.5–14.5)
ERYTHROCYTE [DISTWIDTH] IN BLOOD BY AUTOMATED COUNT: 46.1 FL (ref 35–45)
HCT VFR BLD CALC: 34.1 % (ref 37–47)
HEMOGLOBIN: 11.5 GM/DL (ref 12–16)
IMMATURE GRANS (ABS): 0.03 THOU/MM3 (ref 0–0.07)
IMMATURE GRANULOCYTES: 0.4 %
LYMPHOCYTES # BLD: 21.8 %
LYMPHOCYTES ABSOLUTE: 1.9 THOU/MM3 (ref 1–4.8)
MCH RBC QN AUTO: 31.4 PG (ref 26–33)
MCHC RBC AUTO-ENTMCNC: 33.7 GM/DL (ref 32.2–35.5)
MCV RBC AUTO: 93.2 FL (ref 81–99)
MONOCYTES # BLD: 7.9 %
MONOCYTES ABSOLUTE: 0.7 THOU/MM3 (ref 0.4–1.3)
NUCLEATED RED BLOOD CELLS: 0 /100 WBC
PLATELET # BLD: 186 THOU/MM3 (ref 130–400)
PMV BLD AUTO: 10.6 FL (ref 9.4–12.4)
RBC # BLD: 3.66 MILL/MM3 (ref 4.2–5.4)
SEG NEUTROPHILS: 66 %
SEGMENTED NEUTROPHILS ABSOLUTE COUNT: 5.6 THOU/MM3 (ref 1.8–7.7)
WBC # BLD: 8.5 THOU/MM3 (ref 4.8–10.8)

## 2022-09-03 PROCEDURE — 6370000000 HC RX 637 (ALT 250 FOR IP): Performed by: INTERNAL MEDICINE

## 2022-09-03 PROCEDURE — 51798 US URINE CAPACITY MEASURE: CPT

## 2022-09-03 PROCEDURE — 1200000000 HC SEMI PRIVATE

## 2022-09-03 PROCEDURE — 6370000000 HC RX 637 (ALT 250 FOR IP): Performed by: PHYSICIAN ASSISTANT

## 2022-09-03 PROCEDURE — 85025 COMPLETE CBC W/AUTO DIFF WBC: CPT

## 2022-09-03 PROCEDURE — 36415 COLL VENOUS BLD VENIPUNCTURE: CPT

## 2022-09-03 PROCEDURE — 2580000003 HC RX 258: Performed by: PHYSICIAN ASSISTANT

## 2022-09-03 PROCEDURE — 81003 URINALYSIS AUTO W/O SCOPE: CPT

## 2022-09-03 PROCEDURE — 6360000002 HC RX W HCPCS: Performed by: PHYSICIAN ASSISTANT

## 2022-09-03 RX ADMIN — SENNOSIDES AND DOCUSATE SODIUM 1 TABLET: 50; 8.6 TABLET ORAL at 08:32

## 2022-09-03 RX ADMIN — OXYCODONE AND ACETAMINOPHEN 2 TABLET: 5; 325 TABLET ORAL at 18:08

## 2022-09-03 RX ADMIN — CYCLOBENZAPRINE 10 MG: 10 TABLET, FILM COATED ORAL at 18:08

## 2022-09-03 RX ADMIN — BISACODYL 5 MG: 5 TABLET, COATED ORAL at 08:32

## 2022-09-03 RX ADMIN — OXYCODONE AND ACETAMINOPHEN 2 TABLET: 5; 325 TABLET ORAL at 12:21

## 2022-09-03 RX ADMIN — NALOXEGOL OXALATE 12.5 MG: 12.5 TABLET, FILM COATED ORAL at 06:32

## 2022-09-03 RX ADMIN — ONDANSETRON 4 MG: 2 INJECTION INTRAMUSCULAR; INTRAVENOUS at 08:43

## 2022-09-03 RX ADMIN — BUSPIRONE HYDROCHLORIDE 5 MG: 5 TABLET ORAL at 08:32

## 2022-09-03 RX ADMIN — CLOTRIMAZOLE: 0.01 CREAM TOPICAL at 20:10

## 2022-09-03 RX ADMIN — CYCLOBENZAPRINE 10 MG: 10 TABLET, FILM COATED ORAL at 23:30

## 2022-09-03 RX ADMIN — POLYETHYLENE GLYCOL 3350 17 G: 17 POWDER, FOR SOLUTION ORAL at 08:35

## 2022-09-03 RX ADMIN — SODIUM CHLORIDE, PRESERVATIVE FREE 10 ML: 5 INJECTION INTRAVENOUS at 12:22

## 2022-09-03 RX ADMIN — SENNOSIDES AND DOCUSATE SODIUM 1 TABLET: 50; 8.6 TABLET ORAL at 20:10

## 2022-09-03 RX ADMIN — BUSPIRONE HYDROCHLORIDE 5 MG: 5 TABLET ORAL at 20:10

## 2022-09-03 RX ADMIN — OXYCODONE AND ACETAMINOPHEN 2 TABLET: 5; 325 TABLET ORAL at 04:05

## 2022-09-03 RX ADMIN — CLOTRIMAZOLE: 0.01 CREAM TOPICAL at 08:33

## 2022-09-03 RX ADMIN — SODIUM CHLORIDE, PRESERVATIVE FREE 10 ML: 5 INJECTION INTRAVENOUS at 20:10

## 2022-09-03 RX ADMIN — OXYCODONE AND ACETAMINOPHEN 2 TABLET: 5; 325 TABLET ORAL at 23:30

## 2022-09-03 ASSESSMENT — PAIN DESCRIPTION - LOCATION
LOCATION: BACK
LOCATION: BACK;LEG
LOCATION: BACK
LOCATION: HEAD;BACK
LOCATION: BACK
LOCATION: BACK

## 2022-09-03 ASSESSMENT — PAIN SCALES - GENERAL
PAINLEVEL_OUTOF10: 8
PAINLEVEL_OUTOF10: 7
PAINLEVEL_OUTOF10: 7
PAINLEVEL_OUTOF10: 8
PAINLEVEL_OUTOF10: 9
PAINLEVEL_OUTOF10: 8
PAINLEVEL_OUTOF10: 9
PAINLEVEL_OUTOF10: 8
PAINLEVEL_OUTOF10: 7

## 2022-09-03 ASSESSMENT — PAIN DESCRIPTION - ORIENTATION
ORIENTATION: MID;OUTER
ORIENTATION: MID;RIGHT;LOWER
ORIENTATION: MID;LOWER
ORIENTATION: MID;LOWER

## 2022-09-03 ASSESSMENT — PAIN DESCRIPTION - PAIN TYPE
TYPE: SURGICAL PAIN

## 2022-09-03 ASSESSMENT — PAIN DESCRIPTION - ONSET: ONSET: ON-GOING

## 2022-09-03 ASSESSMENT — PAIN - FUNCTIONAL ASSESSMENT: PAIN_FUNCTIONAL_ASSESSMENT: PREVENTS OR INTERFERES SOME ACTIVE ACTIVITIES AND ADLS

## 2022-09-03 ASSESSMENT — PAIN DESCRIPTION - DESCRIPTORS: DESCRIPTORS: ACHING

## 2022-09-03 ASSESSMENT — PAIN DESCRIPTION - FREQUENCY: FREQUENCY: INTERMITTENT

## 2022-09-03 NOTE — PROCEDURES
Bladder scan was completed by Juani Aguilera at 1440. The residual amount of 0 ml was resulted to [employer].

## 2022-09-03 NOTE — PROCEDURES
A Bladder scan was performed at 2156 . The patient's last void was at guerra catheter  is in place and not voiding per patient . The residual amount was measured to be 982 ML. Report of results was given to FELIPA SHELDON [facility] [place].

## 2022-09-03 NOTE — PLAN OF CARE
Problem: Discharge Planning  Goal: Discharge to home or other facility with appropriate resources  9/3/2022 1522 by Analilia Wheatley RN  Outcome: Progressing  Flowsheets (Taken 9/2/2022 2058 by Paras Arango RN)  Discharge to home or other facility with appropriate resources:   Identify barriers to discharge with patient and caregiver   Arrange for needed discharge resources and transportation as appropriate   Identify discharge learning needs (meds, wound care, etc)   Refer to discharge planning if patient needs post-hospital services based on physician order or complex needs related to functional status, cognitive ability or social support system     Problem: Pain  Goal: Verbalizes/displays adequate comfort level or baseline comfort level  9/3/2022 1522 by Analilia Wheatley RN  Outcome: Progressing  Flowsheets (Taken 9/2/2022 1440 by Chris Daniels RN)  Verbalizes/displays adequate comfort level or baseline comfort level:   Encourage patient to monitor pain and request assistance   Administer analgesics based on type and severity of pain and evaluate response   Assess pain using appropriate pain scale   Implement non-pharmacological measures as appropriate and evaluate response     Problem: Skin/Tissue Integrity  Goal: Absence of new skin breakdown  Description: 1. Monitor for areas of redness and/or skin breakdown  2. Assess vascular access sites hourly  3. Every 4-6 hours minimum:  Change oxygen saturation probe site  4. Every 4-6 hours:  If on nasal continuous positive airway pressure, respiratory therapy assess nares and determine need for appliance change or resting period.   9/3/2022 1522 by Analilia Wheatley RN  Outcome: Progressing  Note: No skin breakdown this shift     Problem: Skin/Tissue Integrity - Adult  Goal: Skin integrity remains intact  9/3/2022 1522 by Analilia Wheatley RN  Outcome: Progressing  Flowsheets  Taken 9/3/2022 1522  Skin Integrity Remains Intact: Monitor for areas of redness

## 2022-09-03 NOTE — PLAN OF CARE
Problem: Discharge Planning  Goal: Discharge to home or other facility with appropriate resources  9/3/2022 0318 by Lilliam Hernandez RN  Outcome: Progressing  Flowsheets (Taken 9/2/2022 2058)  Discharge to home or other facility with appropriate resources:   Identify barriers to discharge with patient and caregiver   Arrange for needed discharge resources and transportation as appropriate   Identify discharge learning needs (meds, wound care, etc)   Refer to discharge planning if patient needs post-hospital services based on physician order or complex needs related to functional status, cognitive ability or social support system     Problem: ABCDS Injury Assessment  Goal: Absence of physical injury  9/3/2022 0318 by Lilliam Hernandez RN  Outcome: Progressing   Call light within reach. Bed alarm on. Bed in lowest position. Fall sign posted. Problem: Pain  Goal: Verbalizes/displays adequate comfort level or baseline comfort level  9/3/2022 0318 by Lilliam Hernandez RN  Outcome: Progressing   Patient able to verbalize understanding of PRN pain medication available. Patient repositioned for comfort. Problem: Skin/Tissue Integrity  Goal: Absence of new skin breakdown  Description: 1.   Monitor for areas of redness and/or skin breakdown  9/3/2022 0318 by Lilliam Hernandez RN  Outcome: Progressing     Problem: Skin/Tissue Integrity - Adult  Goal: Skin integrity remains intact  9/3/2022 0318 by Lilliam Hernandez RN  Outcome: Progressing  Flowsheets (Taken 9/2/2022 2058)  Skin Integrity Remains Intact: Monitor for areas of redness and/or skin breakdown     Problem: Genitourinary - Adult  Goal: Absence of urinary retention  9/3/2022 0318 by Lilliam Hernandez RN  Outcome: Progressing  Flowsheets (Taken 9/2/2022 2058)  Absence of urinary retention:   Monitor intake/output and perform bladder scan as needed   Assess patients ability to void and empty bladder     Problem: Infection - Adult  Goal: Absence of infection at discharge  9/3/2022 0318 by Farnaz Gould RN  Outcome: Progressing  Flowsheets (Taken 9/2/2022 2058)  Absence of infection at discharge:   Assess and monitor for signs and symptoms of infection   Monitor all insertion sites i.e., indwelling lines, tubes and drains   Monitor lab/diagnostic results     Problem: Safety - Adult  Goal: Free from fall injury  9/3/2022 0318 by Farnaz Gould RN  Outcome: Progressing   Call light within reach. Bed alarm on. Bed in lowest position. Fall sign posted. Care plan reviewed with patient. Patient verbalizes understanding of the plan of care and contribute to goal setting.

## 2022-09-03 NOTE — PROGRESS NOTES
Department of Orthopedic Surgery  Spine Service  Progress Note        Subjective:    9/1/22  Aurora Nino is resting in bed. Doing ok this AM. Back pain as expected. No HA. 1 drain pulled out last night. Drainage is low and dark output. 9/2/22  Aurora Nino is resting in bed. Denies HA, overall doing well. Surgical pain as expected. Drain output increased and red coloration. Ok to slowly advance activity, HA precautions. Remove Guerra today. 9/3/22  Aurora Nino is resting in bed. Reports a mild HA with N today. Just received Zofran. Also have phenergan ordered and responded well yesterday. Use Phenergan as first line and then zofran if not relieved. Drain output light red in color and elevated. Continue monitoring drain output with drain at 50% compression. +flatus, no BM. No abdominal pain. Urinary retention yesterday stating previous guerra was not working properly, new guerra placed and working appropriately. Vitals  VITALS:  /75   Pulse 88   Temp 98 °F (36.7 °C) (Oral)   Resp 16   Ht 5' 9\" (1.753 m)   Wt 178 lb 3.2 oz (80.8 kg)   LMP  (LMP Unknown)   SpO2 92%   BMI 26.32 kg/m²   24HR INTAKE/OUTPUT:    Intake/Output Summary (Last 24 hours) at 9/3/2022 0858  Last data filed at 9/3/2022 7283  Gross per 24 hour   Intake 600 ml   Output 8755 ml   Net -8155 ml     URINARY CATHETER OUTPUT (Guerra):  Urinary Catheter 09/02/22 Guerra-Output (mL): 1000 mL  [REMOVED] Urinary Catheter 08/31/22-Output (mL): 2100 mL  DRAIN/TUBE OUTPUT:  Closed/Suction Drain Inferior; Lateral;Right Back Accordion-Output (ml): 190 ml      PHYSICAL EXAM:    Orientation:  alert and oriented to person, place and time    Incision:  dressing in place, clean, dry, intact    Lower Extremity Motor :  quadriceps, extensor hallucis longus, dorsiflexion, plantarflexion 5/5 bilaterally  Lower Extremity Sensory:  Intact L1-S1    Flatus:  positive    ABNORMAL EXAM FINDINGS:  none    LABS:    HgB:    Lab Results   Component Value Date/Time    HGB 11.5 09/03/2022 07:04 AM         ASSESSMENT AND PLAN:    Post operative day 3    1:  Monitor labs and drain output 50% compression; Monitor drain output color and quantity  2:  Activity Level:  HA precautions, Slow progress with activity. Hold PT for now but ok to ambulate if no HA.   3:  Pain Control:  Controlled  4:  Discharge Planning:  Awaiting advance of activity  5:  Urinary retention, continue Brasher  6:  Nausea, 1st line treatment Phenergan, 2nd line Zofran    7:  Continue working on BM.

## 2022-09-04 ENCOUNTER — APPOINTMENT (OUTPATIENT)
Dept: GENERAL RADIOLOGY | Age: 37
DRG: 304 | End: 2022-09-04
Attending: ORTHOPAEDIC SURGERY
Payer: MEDICARE

## 2022-09-04 LAB
BASOPHILS # BLD: 0.7 %
BASOPHILS ABSOLUTE: 0.1 THOU/MM3 (ref 0–0.1)
BILIRUBIN URINE: NEGATIVE
BLOOD, URINE: NEGATIVE
CHARACTER, URINE: CLEAR
COLOR: YELLOW
EOSINOPHIL # BLD: 3.7 %
EOSINOPHILS ABSOLUTE: 0.3 THOU/MM3 (ref 0–0.4)
ERYTHROCYTE [DISTWIDTH] IN BLOOD BY AUTOMATED COUNT: 13.4 % (ref 11.5–14.5)
ERYTHROCYTE [DISTWIDTH] IN BLOOD BY AUTOMATED COUNT: 46.1 FL (ref 35–45)
GLUCOSE, URINE: NEGATIVE MG/DL
HCT VFR BLD CALC: 36.2 % (ref 37–47)
HEMOGLOBIN: 11.9 GM/DL (ref 12–16)
IMMATURE GRANS (ABS): 0.04 THOU/MM3 (ref 0–0.07)
IMMATURE GRANULOCYTES: 0.5 %
KETONES, URINE: NEGATIVE
LEUKOCYTE EST, POC: NEGATIVE
LYMPHOCYTES # BLD: 30 %
LYMPHOCYTES ABSOLUTE: 2.3 THOU/MM3 (ref 1–4.8)
MCH RBC QN AUTO: 30.8 PG (ref 26–33)
MCHC RBC AUTO-ENTMCNC: 32.9 GM/DL (ref 32.2–35.5)
MCV RBC AUTO: 93.8 FL (ref 81–99)
MONOCYTES # BLD: 7.5 %
MONOCYTES ABSOLUTE: 0.6 THOU/MM3 (ref 0.4–1.3)
NITRITE, URINE: NEGATIVE
NUCLEATED RED BLOOD CELLS: 0 /100 WBC
PH UA: 6.5 (ref 5–9)
PLATELET # BLD: 185 THOU/MM3 (ref 130–400)
PMV BLD AUTO: 10.5 FL (ref 9.4–12.4)
PROTEIN UA: NEGATIVE MG/DL
RBC # BLD: 3.86 MILL/MM3 (ref 4.2–5.4)
SEG NEUTROPHILS: 57.6 %
SEGMENTED NEUTROPHILS ABSOLUTE COUNT: 4.3 THOU/MM3 (ref 1.8–7.7)
SPECIFIC GRAVITY UA: 1.02 (ref 1–1.03)
UROBILINOGEN, URINE: 0.2 EU/DL (ref 0–1)
WBC # BLD: 7.5 THOU/MM3 (ref 4.8–10.8)

## 2022-09-04 PROCEDURE — 6370000000 HC RX 637 (ALT 250 FOR IP): Performed by: INTERNAL MEDICINE

## 2022-09-04 PROCEDURE — 36415 COLL VENOUS BLD VENIPUNCTURE: CPT

## 2022-09-04 PROCEDURE — 6370000000 HC RX 637 (ALT 250 FOR IP): Performed by: PHYSICIAN ASSISTANT

## 2022-09-04 PROCEDURE — 74018 RADEX ABDOMEN 1 VIEW: CPT

## 2022-09-04 PROCEDURE — 1200000000 HC SEMI PRIVATE

## 2022-09-04 PROCEDURE — 6360000002 HC RX W HCPCS: Performed by: INTERNAL MEDICINE

## 2022-09-04 PROCEDURE — 85025 COMPLETE CBC W/AUTO DIFF WBC: CPT

## 2022-09-04 PROCEDURE — 2580000003 HC RX 258: Performed by: PHYSICIAN ASSISTANT

## 2022-09-04 PROCEDURE — 6360000002 HC RX W HCPCS: Performed by: PHYSICIAN ASSISTANT

## 2022-09-04 RX ORDER — POLYETHYLENE GLYCOL 3350 17 G/17G
17 POWDER, FOR SOLUTION ORAL 2 TIMES DAILY
Status: DISCONTINUED | OUTPATIENT
Start: 2022-09-04 | End: 2022-09-06 | Stop reason: HOSPADM

## 2022-09-04 RX ORDER — BISACODYL 10 MG
10 SUPPOSITORY, RECTAL RECTAL DAILY PRN
Status: DISCONTINUED | OUTPATIENT
Start: 2022-09-04 | End: 2022-09-06 | Stop reason: HOSPADM

## 2022-09-04 RX ORDER — METOCLOPRAMIDE HYDROCHLORIDE 5 MG/ML
10 INJECTION INTRAMUSCULAR; INTRAVENOUS EVERY 6 HOURS
Status: DISPENSED | OUTPATIENT
Start: 2022-09-04 | End: 2022-09-04

## 2022-09-04 RX ADMIN — SENNOSIDES AND DOCUSATE SODIUM 1 TABLET: 50; 8.6 TABLET ORAL at 08:18

## 2022-09-04 RX ADMIN — METOCLOPRAMIDE 10 MG: 5 INJECTION, SOLUTION INTRAMUSCULAR; INTRAVENOUS at 06:03

## 2022-09-04 RX ADMIN — CYCLOBENZAPRINE 10 MG: 10 TABLET, FILM COATED ORAL at 12:59

## 2022-09-04 RX ADMIN — BUSPIRONE HYDROCHLORIDE 5 MG: 5 TABLET ORAL at 07:59

## 2022-09-04 RX ADMIN — OXYCODONE AND ACETAMINOPHEN 2 TABLET: 5; 325 TABLET ORAL at 12:59

## 2022-09-04 RX ADMIN — CLOTRIMAZOLE: 0.01 CREAM TOPICAL at 21:37

## 2022-09-04 RX ADMIN — HYDROXYZINE PAMOATE 25 MG: 25 CAPSULE ORAL at 02:19

## 2022-09-04 RX ADMIN — SODIUM CHLORIDE, PRESERVATIVE FREE 10 ML: 5 INJECTION INTRAVENOUS at 08:01

## 2022-09-04 RX ADMIN — POLYETHYLENE GLYCOL 3350 17 G: 17 POWDER, FOR SOLUTION ORAL at 08:18

## 2022-09-04 RX ADMIN — SODIUM CHLORIDE, PRESERVATIVE FREE 10 ML: 5 INJECTION INTRAVENOUS at 21:35

## 2022-09-04 RX ADMIN — BISACODYL 5 MG: 5 TABLET, COATED ORAL at 07:59

## 2022-09-04 RX ADMIN — OXYCODONE AND ACETAMINOPHEN 2 TABLET: 5; 325 TABLET ORAL at 06:09

## 2022-09-04 RX ADMIN — BUSPIRONE HYDROCHLORIDE 5 MG: 5 TABLET ORAL at 21:33

## 2022-09-04 RX ADMIN — PROMETHAZINE HYDROCHLORIDE 6.25 MG: 25 INJECTION INTRAMUSCULAR; INTRAVENOUS at 02:15

## 2022-09-04 RX ADMIN — OXYCODONE AND ACETAMINOPHEN 2 TABLET: 5; 325 TABLET ORAL at 21:33

## 2022-09-04 RX ADMIN — NALOXEGOL OXALATE 12.5 MG: 12.5 TABLET, FILM COATED ORAL at 06:03

## 2022-09-04 RX ADMIN — PROMETHAZINE HYDROCHLORIDE 6.25 MG: 25 INJECTION INTRAMUSCULAR; INTRAVENOUS at 17:03

## 2022-09-04 RX ADMIN — CYCLOBENZAPRINE 10 MG: 10 TABLET, FILM COATED ORAL at 21:35

## 2022-09-04 RX ADMIN — OXYCODONE AND ACETAMINOPHEN 2 TABLET: 5; 325 TABLET ORAL at 17:03

## 2022-09-04 RX ADMIN — CLOTRIMAZOLE: 0.01 CREAM TOPICAL at 07:59

## 2022-09-04 RX ADMIN — SENNOSIDES AND DOCUSATE SODIUM 1 TABLET: 50; 8.6 TABLET ORAL at 21:35

## 2022-09-04 ASSESSMENT — PAIN SCALES - GENERAL
PAINLEVEL_OUTOF10: 8
PAINLEVEL_OUTOF10: 9
PAINLEVEL_OUTOF10: 10
PAINLEVEL_OUTOF10: 0
PAINLEVEL_OUTOF10: 8
PAINLEVEL_OUTOF10: 9
PAINLEVEL_OUTOF10: 8

## 2022-09-04 ASSESSMENT — PAIN DESCRIPTION - LOCATION
LOCATION: BACK;HEAD
LOCATION: BACK;OTHER (COMMENT)
LOCATION: HEAD
LOCATION: BACK

## 2022-09-04 ASSESSMENT — PAIN DESCRIPTION - DESCRIPTORS
DESCRIPTORS: ACHING
DESCRIPTORS: ACHING

## 2022-09-04 ASSESSMENT — PAIN DESCRIPTION - ORIENTATION
ORIENTATION: MID;LOWER
ORIENTATION: LOWER;MID

## 2022-09-04 ASSESSMENT — PAIN - FUNCTIONAL ASSESSMENT: PAIN_FUNCTIONAL_ASSESSMENT: PREVENTS OR INTERFERES SOME ACTIVE ACTIVITIES AND ADLS

## 2022-09-04 NOTE — PLAN OF CARE
Problem: Discharge Planning  Goal: Discharge to home or other facility with appropriate resources  9/4/2022 1017 by Jim French RN  Outcome: Progressing  Flowsheets (Taken 9/3/2022 2000 by Holley Norton RN)  Discharge to home or other facility with appropriate resources:   Identify barriers to discharge with patient and caregiver   Arrange for needed discharge resources and transportation as appropriate   Identify discharge learning needs (meds, wound care, etc)   Refer to discharge planning if patient needs post-hospital services based on physician order or complex needs related to functional status, cognitive ability or social support system     Problem: Pain  Goal: Verbalizes/displays adequate comfort level or baseline comfort level  9/4/2022 1017 by Jim French RN  Outcome: Progressing  Flowsheets (Taken 9/2/2022 1440 by Juan Luis Fletcher RN)  Verbalizes/displays adequate comfort level or baseline comfort level:   Encourage patient to monitor pain and request assistance   Administer analgesics based on type and severity of pain and evaluate response   Assess pain using appropriate pain scale   Implement non-pharmacological measures as appropriate and evaluate response     Problem: Skin/Tissue Integrity  Goal: Absence of new skin breakdown  Description: 1. Monitor for areas of redness and/or skin breakdown  2. Assess vascular access sites hourly  3. Every 4-6 hours minimum:  Change oxygen saturation probe site  4. Every 4-6 hours:  If on nasal continuous positive airway pressure, respiratory therapy assess nares and determine need for appliance change or resting period.   9/4/2022 1017 by Jim French RN  Outcome: Progressing  Note: No new skin breakdown this shift     Problem: Skin/Tissue Integrity - Adult  Goal: Skin integrity remains intact  9/4/2022 1017 by Jim French RN  Outcome: Progressing  Flowsheets (Taken 9/4/2022 1016)  Skin Integrity Remains Intact: Monitor for areas of redness and/or skin breakdown     Problem: Genitourinary - Adult  Goal: Absence of urinary retention  9/4/2022 1017 by Norm Orellana RN  Outcome: Progressing  Flowsheets (Taken 9/2/2022 2058 by Jeanne Payan RN)  Absence of urinary retention:   Monitor intake/output and perform bladder scan as needed   Assess patients ability to void and empty bladder     Problem: Infection - Adult  Goal: Absence of infection at discharge  9/4/2022 1017 by Norm Orellana RN  Outcome: Progressing  Flowsheets (Taken 9/3/2022 2000 by Jeanne Payan RN)  Absence of infection at discharge:   Assess and monitor for signs and symptoms of infection   Monitor lab/diagnostic results   Monitor all insertion sites i.e., indwelling lines, tubes and drains     Problem: Safety - Adult  Goal: Free from fall injury  9/4/2022 1017 by Norm Orellana RN  Outcome: Progressing  Flowsheets (Taken 9/4/2022 1015)  Free From Fall Injury: Instruct family/caregiver on patient safety   Care plan reviewed with patient. Patient verbalize understanding of the plan of care and contribute to goal setting.

## 2022-09-04 NOTE — PROGRESS NOTES
Department of Orthopedic Surgery  Spine Service  Progress Note        Subjective:    9/1/22  Liberty Real is resting in bed. Doing ok this AM. Back pain as expected. No HA. 1 drain pulled out last night. Drainage is low and dark output. 9/2/22  Liberty Real is resting in bed. Denies HA, overall doing well. Surgical pain as expected. Drain output increased and red coloration. Ok to slowly advance activity, HA precautions. Remove Guerra today. 9/3/22  Liberty Real is resting in bed. Reports a mild HA with N today. Just received Zofran. Also have phenergan ordered and responded well yesterday. Use Phenergan as first line and then zofran if not relieved. Drain output light red in color and elevated. Continue monitoring drain output with drain at 50% compression. +flatus, no BM. No abdominal pain. Urinary retention yesterday stating previous guerra was not working properly, new guerra placed and working appropriately. 9/4/22  Liberty Real is resting in bed. Noted N and HA when attempted to ambulate yesterday. Drain output increasing. No current HA or N with HOB at 30 degrees. Discussed concern for continued CSF leak and patient and myself decided to stay on bedrest with HOB at 30 degrees for one more day. KUB completed early this morning demonstrating moderate proxima colonic stool with gaseous distention of bowel loops. + flatus, no BM. Continue working on BM, utilize prn bowel meds. Does complain of feeling of pressure like bladder continues to be full.  Guerra working appropriately and bladder scan normal.     Vitals  VITALS:  BP (!) 98/55   Pulse 83   Temp 98.2 °F (36.8 °C) (Oral)   Resp 18   Ht 5' 9\" (1.753 m)   Wt 178 lb 3.2 oz (80.8 kg)   LMP  (LMP Unknown)   SpO2 93%   BMI 26.32 kg/m²   24HR INTAKE/OUTPUT:    Intake/Output Summary (Last 24 hours) at 9/4/2022 1041  Last data filed at 9/4/2022 0413  Gross per 24 hour   Intake 1010 ml   Output 3955 ml   Net -2945 ml     URINARY CATHETER OUTPUT (Guerra):  Urinary Catheter 09/02/22 Brasher-Output (mL): 100 mL  [REMOVED] Urinary Catheter 08/31/22-Output (mL): 2100 mL  DRAIN/TUBE OUTPUT:  Closed/Suction Drain Inferior; Lateral;Right Back Accordion-Output (ml): 0 ml      PHYSICAL EXAM:    Orientation:  alert and oriented to person, place and time    Incision:  dressing in place, clean, dry, intact    Lower Extremity Motor :  quadriceps, extensor hallucis longus, dorsiflexion, plantarflexion 5/5 bilaterally  Lower Extremity Sensory:  Intact L1-S1    Flatus:  positive    ABNORMAL EXAM FINDINGS:  TTP LUQ & LLQ    LABS:    HgB:    Lab Results   Component Value Date/Time    HGB 11.9 09/04/2022 05:15 AM         ASSESSMENT AND PLAN:    Post operative day 4    1:  Monitor labs and drain output 50% compression; Monitor drain output color and quantity  2:  Activity Level:  HA precautions, bedrest with HOB at 30 degrees only  3:  Pain Control:  Controlled  4:  Discharge Planning:  Awaiting advance of activity  5:  Urinary retention, continue Brasher  6:  Nausea, 1st line treatment Phenergan, 2nd line Zofran    7:  Continue working on BM. Utilize prn bowel meds.

## 2022-09-04 NOTE — PLAN OF CARE
Problem: Discharge Planning  Goal: Discharge to home or other facility with appropriate resources  9/4/2022 0325 by Jeanne Payan RN  Outcome: Progressing  Flowsheets (Taken 9/3/2022 2000)  Discharge to home or other facility with appropriate resources:   Identify barriers to discharge with patient and caregiver   Arrange for needed discharge resources and transportation as appropriate   Identify discharge learning needs (meds, wound care, etc)   Refer to discharge planning if patient needs post-hospital services based on physician order or complex needs related to functional status, cognitive ability or social support system     Problem: Pain  Goal: Verbalizes/displays adequate comfort level or baseline comfort level  9/4/2022 0325 by Jeanne Payan RN  Outcome: Progressing   Patient able to verbalize understanding of PRN pain medication available. Patient able to reposition self help with pain management. Problem: Skin/Tissue Integrity  Goal: Absence of new skin breakdown  Description: 1. Monitor for areas of redness and/or skin breakdown  9/4/2022 0325 by Jeanne Payan RN  Outcome: Progressing     Problem: Skin/Tissue Integrity - Adult  Goal: Skin integrity remains intact  9/4/2022 0325 by Jeanne Payan RN  Outcome: Progressing  Flowsheets (Taken 9/3/2022 2000)  Skin Integrity Remains Intact: Monitor for areas of redness and/or skin breakdown     Problem: Genitourinary - Adult  Goal: Absence of urinary retention  9/4/2022 0325 by Jeanne Payan RN  Outcome: Progressing   Patient has guerra catheter that is paten and draining. Output is sufficient.     Problem: Infection - Adult  Goal: Absence of infection at discharge  9/4/2022 0325 by Jeanne Payan RN  Outcome: Progressing  Flowsheets (Taken 9/3/2022 2000)  Absence of infection at discharge:   Assess and monitor for signs and symptoms of infection   Monitor lab/diagnostic results   Monitor all insertion sites i.e., indwelling lines, tubes and drains Problem: Safety - Adult  Goal: Free from fall injury  9/4/2022 0325 by Nat Dimas RN  Outcome: Progressing   Call light within reach. Bed alarm on. Fall sign posted. Care plan reviewed with patient. Patient verbalizes understanding of the plan of care and contribute to goal setting.

## 2022-09-04 NOTE — PROGRESS NOTES
07:10 PM       Assessment and Plan:   Ruptured lumbar intervertebral disc [M51.26]  S/p lumbar laminectomy, PLIFL3-4, PSF L3-S1  VAZQUEZ  Postop ileus, improved    Plan:  Cont post op care. SCD.   Cont Bowel regimen      Bety Cintron MD, MD

## 2022-09-04 NOTE — PROGRESS NOTES
Patient requested to have on call physician contacted. She is frustrated that she has not spoken to Dr. Bernardino Hein since prior to surgery. She continues to have an 8/10 pain constantly. She is concerned that the CSF leak is not repaired. She is complaining of bladder/abdominal pain. The guerra was changed last night due to being displaced. 1275 ml was immediately removed from bladder. Spoke to patient this is possibly bladder spasms from that. The guerra was checked and is in place. It is draining clear yellow urine. Dr. Bao Olivier was notified for this pain. A urinalysis and KUB were ordered. After the KUB patient wanted to attempt to ambulate in room. She sat on the side of the bed got dizzy, nauseous, and got a headache. She was layed back down flat. Given Hydroxyzine and Phenergan with improvement. Louie Duvall was contacted as the person on call. He called and spoke with this nurse stating that nothing would be ordered due to it being 0230 and this is not an emergency.

## 2022-09-05 LAB
ANION GAP SERPL CALCULATED.3IONS-SCNC: 10 MEQ/L (ref 8–16)
BUN BLDV-MCNC: 13 MG/DL (ref 7–22)
CALCIUM SERPL-MCNC: 9 MG/DL (ref 8.5–10.5)
CHLORIDE BLD-SCNC: 100 MEQ/L (ref 98–111)
CO2: 28 MEQ/L (ref 23–33)
CREAT SERPL-MCNC: 0.5 MG/DL (ref 0.4–1.2)
GFR SERPL CREATININE-BSD FRML MDRD: > 90 ML/MIN/1.73M2
GLUCOSE BLD-MCNC: 111 MG/DL (ref 70–108)
POTASSIUM SERPL-SCNC: 4.2 MEQ/L (ref 3.5–5.2)
SODIUM BLD-SCNC: 138 MEQ/L (ref 135–145)

## 2022-09-05 PROCEDURE — 6370000000 HC RX 637 (ALT 250 FOR IP): Performed by: PHYSICIAN ASSISTANT

## 2022-09-05 PROCEDURE — 6370000000 HC RX 637 (ALT 250 FOR IP): Performed by: INTERNAL MEDICINE

## 2022-09-05 PROCEDURE — 80048 BASIC METABOLIC PNL TOTAL CA: CPT

## 2022-09-05 PROCEDURE — 1200000000 HC SEMI PRIVATE

## 2022-09-05 PROCEDURE — 6360000002 HC RX W HCPCS: Performed by: PHYSICIAN ASSISTANT

## 2022-09-05 PROCEDURE — 36415 COLL VENOUS BLD VENIPUNCTURE: CPT

## 2022-09-05 PROCEDURE — 2580000003 HC RX 258: Performed by: PHYSICIAN ASSISTANT

## 2022-09-05 RX ADMIN — OXYCODONE AND ACETAMINOPHEN 2 TABLET: 5; 325 TABLET ORAL at 11:45

## 2022-09-05 RX ADMIN — CLOTRIMAZOLE: 0.01 CREAM TOPICAL at 20:20

## 2022-09-05 RX ADMIN — POLYETHYLENE GLYCOL 3350 17 G: 17 POWDER, FOR SOLUTION ORAL at 20:21

## 2022-09-05 RX ADMIN — OXYCODONE AND ACETAMINOPHEN 2 TABLET: 5; 325 TABLET ORAL at 15:49

## 2022-09-05 RX ADMIN — CLOTRIMAZOLE: 0.01 CREAM TOPICAL at 08:48

## 2022-09-05 RX ADMIN — OXYCODONE AND ACETAMINOPHEN 2 TABLET: 5; 325 TABLET ORAL at 20:21

## 2022-09-05 RX ADMIN — SODIUM CHLORIDE, PRESERVATIVE FREE 10 ML: 5 INJECTION INTRAVENOUS at 20:21

## 2022-09-05 RX ADMIN — NALOXEGOL OXALATE 12.5 MG: 12.5 TABLET, FILM COATED ORAL at 06:41

## 2022-09-05 RX ADMIN — BUSPIRONE HYDROCHLORIDE 5 MG: 5 TABLET ORAL at 20:20

## 2022-09-05 RX ADMIN — OXYCODONE AND ACETAMINOPHEN 2 TABLET: 5; 325 TABLET ORAL at 07:41

## 2022-09-05 RX ADMIN — SENNOSIDES AND DOCUSATE SODIUM 1 TABLET: 50; 8.6 TABLET ORAL at 20:21

## 2022-09-05 RX ADMIN — POLYETHYLENE GLYCOL 3350 17 G: 17 POWDER, FOR SOLUTION ORAL at 08:43

## 2022-09-05 RX ADMIN — BISACODYL 5 MG: 5 TABLET, COATED ORAL at 08:44

## 2022-09-05 RX ADMIN — SENNOSIDES AND DOCUSATE SODIUM 1 TABLET: 50; 8.6 TABLET ORAL at 08:43

## 2022-09-05 RX ADMIN — SODIUM CHLORIDE, PRESERVATIVE FREE 10 ML: 5 INJECTION INTRAVENOUS at 08:43

## 2022-09-05 RX ADMIN — PROMETHAZINE HYDROCHLORIDE 6.25 MG: 25 INJECTION INTRAMUSCULAR; INTRAVENOUS at 20:21

## 2022-09-05 RX ADMIN — BUSPIRONE HYDROCHLORIDE 5 MG: 5 TABLET ORAL at 08:44

## 2022-09-05 ASSESSMENT — PAIN SCALES - GENERAL
PAINLEVEL_OUTOF10: 8
PAINLEVEL_OUTOF10: 7
PAINLEVEL_OUTOF10: 8
PAINLEVEL_OUTOF10: 6
PAINLEVEL_OUTOF10: 8
PAINLEVEL_OUTOF10: 8
PAINLEVEL_OUTOF10: 7

## 2022-09-05 ASSESSMENT — PAIN DESCRIPTION - LOCATION
LOCATION: BACK;HEAD
LOCATION: BACK
LOCATION: BACK
LOCATION: BACK;HEAD
LOCATION: BACK

## 2022-09-05 ASSESSMENT — PAIN DESCRIPTION - DESCRIPTORS
DESCRIPTORS: ACHING;SORE
DESCRIPTORS: ACHING
DESCRIPTORS: ACHING;SORE
DESCRIPTORS: ACHING
DESCRIPTORS: ACHING;SORE

## 2022-09-05 ASSESSMENT — PAIN DESCRIPTION - ONSET
ONSET: ON-GOING

## 2022-09-05 ASSESSMENT — PAIN - FUNCTIONAL ASSESSMENT
PAIN_FUNCTIONAL_ASSESSMENT: PREVENTS OR INTERFERES SOME ACTIVE ACTIVITIES AND ADLS

## 2022-09-05 ASSESSMENT — PAIN DESCRIPTION - FREQUENCY
FREQUENCY: CONTINUOUS

## 2022-09-05 ASSESSMENT — PAIN DESCRIPTION - ORIENTATION
ORIENTATION: LOWER
ORIENTATION: LOWER

## 2022-09-05 ASSESSMENT — PAIN DESCRIPTION - PAIN TYPE: TYPE: SURGICAL PAIN

## 2022-09-05 NOTE — PLAN OF CARE
Problem: Discharge Planning  Goal: Discharge to home or other facility with appropriate resources  Outcome: Progressing  Flowsheets (Taken 9/5/2022 0845)  Discharge to home or other facility with appropriate resources:   Identify barriers to discharge with patient and caregiver   Arrange for needed discharge resources and transportation as appropriate     Problem: Pain  Goal: Verbalizes/displays adequate comfort level or baseline comfort level  Outcome: Progressing  Flowsheets (Taken 9/5/2022 0730)  Verbalizes/displays adequate comfort level or baseline comfort level:   Encourage patient to monitor pain and request assistance   Assess pain using appropriate pain scale     Problem: Skin/Tissue Integrity  Goal: Absence of new skin breakdown  Description: 1. Monitor for areas of redness and/or skin breakdown    Problem: Infection - Adult  Goal: Absence of infection at discharge  Outcome: Progressing  Flowsheets (Taken 9/5/2022 0845)  Absence of infection at discharge:   Assess and monitor for signs and symptoms of infection   Monitor lab/diagnostic results     Problem: Safety - Adult  Goal: Free from fall injury  Outcome: Progressing   Outcome: Progressing  Call light within reach. Bed alarm on. Care plan reviewed with patient. Patient verbalize understanding of the plan of care and contribute to goal setting.

## 2022-09-05 NOTE — PROGRESS NOTES
Progress note   [facility]             Patient:  Danis Ferrell  YOB: 1985    MRN: [id number]   Acct:  [de-identified]   [id number]  Primary Care Physician: No primary care provider on file. Admit Date: 8/31/2022           Subjective: Pt sitting up in bed and in no acute distress. Pt stating this is the best she has felt today with no headaches. Pt having productive cough but not wanting to vomit while producing it out. Pt states she is using incentive spirometry. Pt denies any n/v or numbness tingling in legs. SCDs are applied. Pt states she does feel weak in the legs when ambulating. Drain in situ and patent clear red drainage. Objective:      Physical Exam:    Vitals:Patient Vitals for the past 24 hrs:   BP Temp Temp src Pulse Resp SpO2   09/05/22 1515 126/85 98.7 °F (37.1 °C) Oral (!) 102 20 93 %   09/05/22 1145 107/64 98.1 °F (36.7 °C) Oral 90 16 94 %   09/05/22 0741 -- -- -- -- 16 --   09/05/22 0730 102/61 98 °F (36.7 °C) Oral 80 16 93 %   09/05/22 0342 108/62 98.2 °F (36.8 °C) Oral 78 -- 93 %   09/04/22 2133 -- -- -- -- 18 --   09/04/22 2015 108/63 99.1 °F (37.3 °C) Oral 95 18 92 %     Weight: Weight: 178 lb 3.2 oz (80.8 kg)     24 hour intake/output:  Intake/Output Summary (Last 24 hours) at 9/5/2022 1622  Last data filed at 9/5/2022 1305  Gross per 24 hour   Intake 1490 ml   Output 3025 ml   Net -1535 ml       General appearance - alert, well appearing, and in no distress  Eyes - pupils equal and reactive, extraocular eye movements intact  Mouth - mucous membranes moist, pharynx normal without lesions  Neck - supple, no significant adenopathy  Chest - clear to auscultation, no wheezes, rales or rhonchi, symmetric air entry. Cough noted  Heart - normal rate, regular rhythm, normal S1, S2, no murmurs, rubs, clicks or gallops  Abdomen - soft, nontender, nondistended, no masses or organomegaly, pos bs.   Neurological - alert, oriented, normal speech, no focal findings or movement disorder noted  Extremities - peripheral pulses normal, no pedal edema, no clubbing or cyanosis  Skin -warm and dry     Review of Labs and Diagnostic Testing:    CBC:   Recent Labs     09/04/22  0515   WBC 7.5   HGB 11.9*   HCT 36.2*   MCV 93.8        BMP:   Recent Labs     09/05/22  1505      K 4.2      CO2 28   BUN 13   CREATININE 0.5   CALCIUM 9.0   GLUCOSE 111*     PT/INR: No results for input(s): PROTIME, INR in the last 72 hours. APTT: No results for input(s): APTT in the last 72 hours. Lipids: No results for input(s): ALKPHOS, ALT, AST, BILITOT, BILIDIR, LABALBU, AMYLASE, LIPASE in the last 72 hours. Troponin: No results for input(s): TROPONINT in the last 72 hours. Imaging:  [unfilled]    EKG:      Diet: ADULT DIET; Regular  ADULT ORAL NUTRITION SUPPLEMENT; Breakfast, Lunch, Dinner; Other Oral Supplement;  Activa Yogurt at Breakfast; Hot Beverage with all meals        Data:   Scheduled Meds: Scheduled Meds:   polyethylene glycol  17 g Oral BID    nicotine  1 patch TransDERmal Daily    clotrimazole   Topical BID    busPIRone  5 mg Oral BID    sodium chloride flush  5-40 mL IntraVENous 2 times per day    bisacodyl  5 mg Oral Daily    sennosides-docusate sodium  1 tablet Oral BID    naloxegol  12.5 mg Oral QAM AC     Continuous Infusions:   sodium chloride       PRN Meds:.bisacodyl, promethazine, hydrOXYzine pamoate, albuterol sulfate HFA, sodium chloride flush, sodium chloride, ondansetron **OR** ondansetron, cyclobenzaprine, magnesium hydroxide, oxyCODONE-acetaminophen **OR** oxyCODONE-acetaminophen  Continuous Infusions:   sodium chloride           Assessment   Ruptured lumbar intervertebral disc [M51.26]  S/p lumbar laminectomy, PLIFL3-4, PSF L3-S1  VAZQUEZ  Postop ileus, improved    Plan   Continue bronchodilators  SCDs  Monitor headaches  Monitor drainage    Electronically signed by GARRY Monge CNP on 9/5/2022 at 4:22 PM    Assessment and plan of care discussed with supervising physician, Dr Madison Lainez. Addendum/attestation by Gia Duckworth MD:  I have seen and examined the patient independently. Face to face evaluation and examination was performed. The above evaluation and note has been reviewed. Laboratory and radiological data were reviewed. I Have discussed with Regina Rendon CNP about this patient in detail. The above assessment and plan has been reviewed. Please see my modifications mentioned below. My modifications: For possible repair of suspected durotomy on Wednesday if drainage persists per orthopedic service    Dr. Sunny Harkins resumes care in a.m.     Electronically signed by Barrett Elizabeth MD on 9/5/2022 at 5:00 PM

## 2022-09-05 NOTE — PROGRESS NOTES
Department of Orthopedic Surgery  Spine Service  Progress Note        Subjective:    9/1/22  Lilian Donohue is resting in bed. Doing ok this AM. Back pain as expected. No HA. 1 drain pulled out last night. Drainage is low and dark output. 9/2/22  Lilian Donohue is resting in bed. Denies HA, overall doing well. Surgical pain as expected. Drain output increased and red coloration. Ok to slowly advance activity, HA precautions. Remove Guerra today. 9/3/22  Lilian Donohue is resting in bed. Reports a mild HA with N today. Just received Zofran. Also have phenergan ordered and responded well yesterday. Use Phenergan as first line and then zofran if not relieved. Drain output light red in color and elevated. Continue monitoring drain output with drain at 50% compression. +flatus, no BM. No abdominal pain. Urinary retention yesterday stating previous guerra was not working properly, new guerra placed and working appropriately. 9/4/22  Lilian Donohue is resting in bed. Noted N and HA when attempted to ambulate yesterday. Drain output increasing. No current HA or N with HOB at 30 degrees. Discussed concern for continued CSF leak and patient and myself decided to stay on bedrest with HOB at 30 degrees for one more day. KUB completed early this morning demonstrating moderate proxima colonic stool with gaseous distention of bowel loops. + flatus, no BM. Continue working on BM, utilize prn bowel meds. Does complain of feeling of pressure like bladder continues to be full. Guerra working appropriately and bladder scan normal.     9/5/22  Lilian Donohue is resting in bed complaining of HA and posterior cervical pain. Mild improvement when laying flat. S.O. at bedside. Drain output clear red in color. HA now positional concerning for CSF leak. Discussed with Dr. Rica Berger, will plan to mobilize patient as tolerated and return to OR on Wednesday for lumbar wound exploration with potential durotomy repair. Remove guerra today. +BM.      Vitals  VITALS:  /61 Pulse 80   Temp 98 °F (36.7 °C) (Oral)   Resp 16   Ht 5' 9\" (1.753 m)   Wt 178 lb 3.2 oz (80.8 kg)   LMP  (LMP Unknown)   SpO2 93%   BMI 26.32 kg/m²   24HR INTAKE/OUTPUT:    Intake/Output Summary (Last 24 hours) at 9/5/2022 0908  Last data filed at 9/5/2022 4731  Gross per 24 hour   Intake 750 ml   Output 2795 ml   Net -2045 ml     URINARY CATHETER OUTPUT (Guerra):  Urinary Catheter 09/02/22 Guerra-Output (mL): 900 mL  [REMOVED] Urinary Catheter 08/31/22-Output (mL): 2100 mL  DRAIN/TUBE OUTPUT:  Closed/Suction Drain Inferior; Lateral;Right Back Accordion-Output (ml): 150 ml      PHYSICAL EXAM:    Orientation:  alert and oriented to person, place and time    Incision:  dressing in place, clean, dry, intact    Lower Extremity Motor :  quadriceps, extensor hallucis longus, dorsiflexion, plantarflexion 5/5 bilaterally  Lower Extremity Sensory:  Intact L1-S1    Flatus:  positive    ABNORMAL EXAM FINDINGS:  none    LABS:    HgB:    Lab Results   Component Value Date/Time    HGB 11.9 09/04/2022 05:15 AM         ASSESSMENT AND PLAN:    Post operative day 5    1:  Monitor labs and drain output 50% compression; Monitor drain output color and quantity  2:  Activity Level:  Ok to ambulate as tolerated  3:  Pain Control:  Controlled  4:  Discharge Planning:  Awaiting advance of activity  5:  Urinary retention, continue Guerra  6:  Nausea, 1st line treatment Phenergan, 2nd line Zofran    7:  Remove guerra today

## 2022-09-06 VITALS
WEIGHT: 178.2 LBS | SYSTOLIC BLOOD PRESSURE: 112 MMHG | DIASTOLIC BLOOD PRESSURE: 72 MMHG | RESPIRATION RATE: 18 BRPM | HEIGHT: 69 IN | OXYGEN SATURATION: 97 % | BODY MASS INDEX: 26.39 KG/M2 | HEART RATE: 95 BPM | TEMPERATURE: 98.8 F

## 2022-09-06 PROCEDURE — 6370000000 HC RX 637 (ALT 250 FOR IP): Performed by: PHYSICIAN ASSISTANT

## 2022-09-06 PROCEDURE — 97116 GAIT TRAINING THERAPY: CPT

## 2022-09-06 PROCEDURE — 97535 SELF CARE MNGMENT TRAINING: CPT

## 2022-09-06 PROCEDURE — 6360000002 HC RX W HCPCS: Performed by: PHYSICIAN ASSISTANT

## 2022-09-06 PROCEDURE — 2580000003 HC RX 258: Performed by: PHYSICIAN ASSISTANT

## 2022-09-06 PROCEDURE — 6370000000 HC RX 637 (ALT 250 FOR IP): Performed by: INTERNAL MEDICINE

## 2022-09-06 PROCEDURE — 97166 OT EVAL MOD COMPLEX 45 MIN: CPT

## 2022-09-06 PROCEDURE — 97163 PT EVAL HIGH COMPLEX 45 MIN: CPT

## 2022-09-06 RX ORDER — ONDANSETRON 4 MG/1
4 TABLET, ORALLY DISINTEGRATING ORAL EVERY 8 HOURS PRN
Qty: 20 TABLET | Refills: 0 | Status: SHIPPED | OUTPATIENT
Start: 2022-09-06

## 2022-09-06 RX ORDER — OXYCODONE HYDROCHLORIDE AND ACETAMINOPHEN 5; 325 MG/1; MG/1
1 TABLET ORAL EVERY 4 HOURS PRN
Qty: 42 TABLET | Refills: 0 | Status: SHIPPED | OUTPATIENT
Start: 2022-09-06 | End: 2022-09-13

## 2022-09-06 RX ORDER — CLOTRIMAZOLE 1 %
CREAM (GRAM) TOPICAL
Qty: 1 EACH | Refills: 1 | Status: SHIPPED | OUTPATIENT
Start: 2022-09-06 | End: 2022-09-13

## 2022-09-06 RX ORDER — SENNA AND DOCUSATE SODIUM 50; 8.6 MG/1; MG/1
1 TABLET, FILM COATED ORAL 2 TIMES DAILY PRN
Qty: 60 TABLET | Refills: 0 | Status: SHIPPED | OUTPATIENT
Start: 2022-09-06

## 2022-09-06 RX ORDER — CYCLOBENZAPRINE HCL 10 MG
10 TABLET ORAL 3 TIMES DAILY PRN
Qty: 50 TABLET | Refills: 0 | Status: SHIPPED | OUTPATIENT
Start: 2022-09-06 | End: 2022-09-16

## 2022-09-06 RX ORDER — HYDROXYZINE PAMOATE 25 MG/1
25 CAPSULE ORAL 4 TIMES DAILY PRN
Qty: 28 CAPSULE | Refills: 0 | Status: SHIPPED | OUTPATIENT
Start: 2022-09-06 | End: 2022-09-13

## 2022-09-06 RX ADMIN — BISACODYL 5 MG: 5 TABLET, COATED ORAL at 08:32

## 2022-09-06 RX ADMIN — PROMETHAZINE HYDROCHLORIDE 6.25 MG: 25 INJECTION INTRAMUSCULAR; INTRAVENOUS at 11:44

## 2022-09-06 RX ADMIN — CYCLOBENZAPRINE 10 MG: 10 TABLET, FILM COATED ORAL at 08:31

## 2022-09-06 RX ADMIN — SODIUM CHLORIDE, PRESERVATIVE FREE 10 ML: 5 INJECTION INTRAVENOUS at 08:35

## 2022-09-06 RX ADMIN — BUSPIRONE HYDROCHLORIDE 5 MG: 5 TABLET ORAL at 08:32

## 2022-09-06 RX ADMIN — NALOXEGOL OXALATE 12.5 MG: 12.5 TABLET, FILM COATED ORAL at 06:21

## 2022-09-06 RX ADMIN — OXYCODONE AND ACETAMINOPHEN 2 TABLET: 5; 325 TABLET ORAL at 03:43

## 2022-09-06 RX ADMIN — POLYETHYLENE GLYCOL 3350 17 G: 17 POWDER, FOR SOLUTION ORAL at 08:31

## 2022-09-06 RX ADMIN — OXYCODONE AND ACETAMINOPHEN 2 TABLET: 5; 325 TABLET ORAL at 08:31

## 2022-09-06 RX ADMIN — SENNOSIDES AND DOCUSATE SODIUM 1 TABLET: 50; 8.6 TABLET ORAL at 08:31

## 2022-09-06 RX ADMIN — CLOTRIMAZOLE: 0.01 CREAM TOPICAL at 08:31

## 2022-09-06 ASSESSMENT — PAIN DESCRIPTION - PAIN TYPE: TYPE: SURGICAL PAIN

## 2022-09-06 ASSESSMENT — PAIN DESCRIPTION - DESCRIPTORS
DESCRIPTORS: ACHING;SORE
DESCRIPTORS: ACHING

## 2022-09-06 ASSESSMENT — PAIN DESCRIPTION - LOCATION
LOCATION: BACK
LOCATION: BACK

## 2022-09-06 ASSESSMENT — PAIN DESCRIPTION - ORIENTATION
ORIENTATION: MID
ORIENTATION: LOWER

## 2022-09-06 ASSESSMENT — PAIN SCALES - GENERAL
PAINLEVEL_OUTOF10: 7
PAINLEVEL_OUTOF10: 7
PAINLEVEL_OUTOF10: 9

## 2022-09-06 ASSESSMENT — PAIN - FUNCTIONAL ASSESSMENT: PAIN_FUNCTIONAL_ASSESSMENT: PREVENTS OR INTERFERES SOME ACTIVE ACTIVITIES AND ADLS

## 2022-09-06 ASSESSMENT — PAIN DESCRIPTION - FREQUENCY: FREQUENCY: CONTINUOUS

## 2022-09-06 ASSESSMENT — PAIN DESCRIPTION - ONSET: ONSET: ON-GOING

## 2022-09-06 NOTE — PLAN OF CARE
Problem: Discharge Planning  Goal: Discharge to home or other facility with appropriate resources  9/6/2022 0107 by Ki Gaston RN  Outcome: Progressing  Flowsheets (Taken 9/5/2022 2009)  Discharge to home or other facility with appropriate resources:   Identify barriers to discharge with patient and caregiver   Arrange for needed discharge resources and transportation as appropriate   Identify discharge learning needs (meds, wound care, etc)   Refer to discharge planning if patient needs post-hospital services based on physician order or complex needs related to functional status, cognitive ability or social support system     Problem: Pain  Goal: Verbalizes/displays adequate comfort level or baseline comfort level  9/6/2022 0107 by Ki Gaston RN  Outcome: Progressing  Flowsheets (Taken 9/6/2022 0107)  Verbalizes/displays adequate comfort level or baseline comfort level:   Encourage patient to monitor pain and request assistance   Administer analgesics based on type and severity of pain and evaluate response   Consider cultural and social influences on pain and pain management   Assess pain using appropriate pain scale   Implement non-pharmacological measures as appropriate and evaluate response     Problem: Skin/Tissue Integrity  Goal: Absence of new skin breakdown  Description: 1. Monitor for areas of redness and/or skin breakdown  2. Assess vascular access sites hourly  3. Every 4-6 hours minimum:  Change oxygen saturation probe site  4. Every 4-6 hours:  If on nasal continuous positive airway pressure, respiratory therapy assess nares and determine need for appliance change or resting period. 9/6/2022 0107 by Ki Gaston RN  Outcome: Progressing  Note: No new skin breakdown noted on assessment. Will continue to monitor.      Problem: Skin/Tissue Integrity - Adult  Goal: Skin integrity remains intact  9/6/2022 0107 by Ki Gaston RN  Outcome: Progressing  Flowsheets  Taken 9/6/2022 0106 by Chip Gaytan RN  Skin Integrity Remains Intact:   Monitor for areas of redness and/or skin breakdown   Assess vascular access sites hourly    Problem: Genitourinary - Adult  Goal: Absence of urinary retention  Outcome: Progressing  Flowsheets (Taken 9/5/2022 2009)  Absence of urinary retention:   Assess patients ability to void and empty bladder   Monitor intake/output and perform bladder scan as needed     Problem: Infection - Adult  Goal: Absence of infection at discharge  9/6/2022 0107 by Chip Gaytan RN  Outcome: Progressing  Flowsheets (Taken 9/5/2022 2009)  Absence of infection at discharge:   Assess and monitor for signs and symptoms of infection   Monitor lab/diagnostic results   Monitor all insertion sites i.e., indwelling lines, tubes and drains   Administer medications as ordered   Instruct and encourage patient and family to use good hand hygiene technique     Problem: Safety - Adult  Goal: Free from fall injury  9/6/2022 0107 by Chip Gaytan RN  Outcome: Progressing  Flowsheets  Taken 9/6/2022 0105 by Chip Gaytan RN  Free From Fall Injury: Instruct family/caregiver on patient safety    Problem: ABCDS Injury Assessment  Goal: Absence of physical injury  Recent Flowsheet Documentation  Taken 9/6/2022 0105 by Chip Gaytan RN  Absence of Physical Injury: Implement safety measures based on patient assessment     Patient participated in plan of care and contributed to goal setting.

## 2022-09-06 NOTE — PROGRESS NOTES
Department of Orthopedic Surgery  Spine Service  Progress Note        Subjective:    9/1/22  Jamarcus Teran is resting in bed. Doing ok this AM. Back pain as expected. No HA. 1 drain pulled out last night. Drainage is low and dark output. 9/2/22  Jamarcus Teran is resting in bed. Denies HA, overall doing well. Surgical pain as expected. Drain output increased and red coloration. Ok to slowly advance activity, HA precautions. Remove Guerra today. 9/3/22  Jamarcus Teran is resting in bed. Reports a mild HA with N today. Just received Zofran. Also have phenergan ordered and responded well yesterday. Use Phenergan as first line and then zofran if not relieved. Drain output light red in color and elevated. Continue monitoring drain output with drain at 50% compression. +flatus, no BM. No abdominal pain. Urinary retention yesterday stating previous guerra was not working properly, new guerra placed and working appropriately. 9/4/22  Jamarcus Teran is resting in bed. Noted N and HA when attempted to ambulate yesterday. Drain output increasing. No current HA or N with HOB at 30 degrees. Discussed concern for continued CSF leak and patient and myself decided to stay on bedrest with HOB at 30 degrees for one more day. KUB completed early this morning demonstrating moderate proxima colonic stool with gaseous distention of bowel loops. + flatus, no BM. Continue working on BM, utilize prn bowel meds. Does complain of feeling of pressure like bladder continues to be full. Guerra working appropriately and bladder scan normal.     9/5/22  Jamarcus Teran is resting in bed complaining of HA and posterior cervical pain. Mild improvement when laying flat. S.O. at bedside. Drain output clear red in color. HA now positional concerning for CSF leak. Discussed with Dr. Solis Delatorre, will plan to mobilize patient as tolerated and return to OR on Wednesday for lumbar wound exploration with potential durotomy repair. Remove guerra today.  +BM.     9/6/22  Jamarcus Teran is resting in bed. Denies HA and feeling overall better. Sat up and ambulated to restroom yesterday without any HA or N appreciated. Will plan to work with PT/OT today and if doing well with no HA/N with ambulation, potential discharge this afternoon. If not, will plan to return to OR tomorrow for lumbar wound exploration with possible durotomy repair. Vitals  VITALS:  BP (!) 100/58   Pulse 99   Temp 98.5 °F (36.9 °C) (Oral)   Resp 18   Ht 5' 9\" (1.753 m)   Wt 178 lb 3.2 oz (80.8 kg)   LMP  (LMP Unknown)   SpO2 94%   BMI 26.32 kg/m²   24HR INTAKE/OUTPUT:    Intake/Output Summary (Last 24 hours) at 9/6/2022 0657  Last data filed at 9/6/2022 6257  Gross per 24 hour   Intake 1600 ml   Output 1150 ml   Net 450 ml     URINARY CATHETER OUTPUT (Brasher):  [REMOVED] Urinary Catheter 09/02/22 Brasher-Output (mL): 820 mL  [REMOVED] Urinary Catheter 08/31/22-Output (mL): 2100 mL  DRAIN/TUBE OUTPUT:  Closed/Suction Drain Inferior; Lateral;Right Back Accordion-Output (ml): 50 ml      PHYSICAL EXAM:    Orientation:  alert and oriented to person, place and time    Incision:  dressing in place, clean, dry, intact    Lower Extremity Motor :  quadriceps, extensor hallucis longus, dorsiflexion, plantarflexion 5/5 bilaterally  Lower Extremity Sensory:  Intact L1-S1    Flatus:  positive    ABNORMAL EXAM FINDINGS:  none    LABS:    HgB:    Lab Results   Component Value Date/Time    HGB 11.9 09/04/2022 05:15 AM         ASSESSMENT AND PLAN:    Post operative day 6    1:  Monitor labs and drain output 50% compression; Monitor drain output color and quantity q8hrs  2:  Activity Level:  Ok to ambulate as tolerated; PT/OT today  3:  Pain Control:  Controlled  4:  Discharge Planning:  Awaiting advance of activity, potential this afternoon if no HA when working with PT/OT and pain overall controlled. If develops HA, will plan on returning to OR tomorrow for lumbar wound exploration with possible durotomy repair.    5:  Urinary retention, resolved  6: Nausea, 1st line treatment Phenergan, 2nd line Zofran

## 2022-09-06 NOTE — PROGRESS NOTES
Explained discharge instructions to patient and family at bedside. No questions or concerns at this time.  Nancy Moise RN, BSN, ALEXANDRA

## 2022-09-06 NOTE — PROGRESS NOTES
INTERNAL MEDICINE Progress Note  9/6/2022 11:15 AM  Subjective:   Admit Date: 8/31/2022  PCP: No primary care provider on file.   Interval History:   No headache this am  BM +  No nausea, no vomiting  No legs weakness    Objective:   Vitals: /61   Pulse 88   Temp 97.9 °F (36.6 °C) (Oral)   Resp 18   Ht 5' 9\" (1.753 m)   Wt 178 lb 3.2 oz (80.8 kg)   LMP  (LMP Unknown)   SpO2 96%   BMI 26.32 kg/m²   General appearance: alert and cooperative with exam  HEENT: Normocephalic,   Neck: no JVD,   Lungs: clear to auscultation bilaterally  Heart: S1, S2 normal  Abdomen: soft, non-tender; bowel sounds normal; no masses,  no organomegaly  Extremities: no edema,   Neurologic: Alert, oriented, thought content appropriate  Back: drain x1 in situ      Medications:   Scheduled Meds:   polyethylene glycol  17 g Oral BID    nicotine  1 patch TransDERmal Daily    clotrimazole   Topical BID    busPIRone  5 mg Oral BID    sodium chloride flush  5-40 mL IntraVENous 2 times per day    bisacodyl  5 mg Oral Daily    sennosides-docusate sodium  1 tablet Oral BID    naloxegol  12.5 mg Oral QAM AC     Continuous Infusions:   sodium chloride         Lab Results:   CBC:   Recent Labs     09/04/22  0515   WBC 7.5   HGB 11.9*          BMP:    Recent Labs     09/05/22  1505      K 4.2      CO2 28   BUN 13   CREATININE 0.5   GLUCOSE 111*       HgBA1c:    Lab Results   Component Value Date/Time    LABA1C 5.5 04/05/2013 07:09 PM     TSH:    Lab Results   Component Value Date/Time    TSH 1.670 01/16/2021 08:07 AM     VITAMIN B12: No components found for: B12  FOLATE:    Lab Results   Component Value Date/Time    FOLATE 8.5 04/05/2013 07:10 PM     IRON:    Lab Results   Component Value Date/Time    IRON 83 04/05/2013 07:10 PM     FERRITIN:    Lab Results   Component Value Date/Time    FERRITIN 95 04/05/2013 07:10 PM       Assessment and Plan:   Ruptured lumbar intervertebral disc [M51.26]  S/p lumbar laminectomy, PLIFL3-4, PSF L3-S1  VAZQUEZ  Postop ileus, improved    Plan:  Cont post op care. SCD.   Cont Bowel regimen  PT/OT  stable clinically      Carmita Mabry MD, MD

## 2022-09-06 NOTE — PROGRESS NOTES
Pia 83  INPATIENT PHYSICAL THERAPY  EVALUATION  [facility] ORTHOPEDICS 7K - 7K-[id number]-A    Time In: 7454  Time Out: 8626  Timed Code Treatment Minutes: 12 Minutes  Minutes: 21          Date: 2022  Patient Name: Christpoher Muñoz,  Gender:  female        MRN: [id number]  : 1985  (40 y.o.)      Referring Practitioner: MARIBEL Rogers  Diagnosis: ruptured lumbar intervertebral disc  Additional Pertinent Hx: admit with above diagnosis, s/pREMOVAL OF HARDWARE L4-S1, LUMBAR LAMINECTOMY PLIF L3-L4, PSF L3-S1 WITH SURGALIGN on 22     Restrictions/Precautions:  Restrictions/Precautions: General Precautions  Required Braces or Orthoses  Spinal: Lumbar Corset  Position Activity Restriction  Spinal Precautions: No Bending, No Lifting, No Twisting    Subjective:  Chart Reviewed: Yes  Patient assessed for rehabilitation services?: Yes  Subjective: pleasant and cooperative, per pt no HA with mobility and hoping to go home today    General:     Vision: Impaired  Vision Exceptions: Wears glasses at all times  Hearing: Within functional limits       Pain: 7-8/10: back per pt    Vitals: Vitals not assessed per clinical judgement, see nursing flowsheet    Social/Functional History:    Lives With: Significant other (3 kids)  Type of Home: House  Home Layout: One level  Home Access: Stairs to enter with rails  Entrance Stairs - Number of Steps: 3  Home Equipment: Walker, rolling     Bathroom Shower/Tub: Tub/Shower unit  Bathroom Toilet: Standard  Bathroom Accessibility: Accessible       ADL Assistance: Independent  Homemaking Assistance: Independent  Homemaking Responsibilities: Yes  Ambulation Assistance: Independent  Transfer Assistance: Independent    Active : Yes          OBJECTIVE:  Range of Motion:  Bilateral Lower Extremity: WFL    Strength:  Bilateral Lower Extremity: WFL    Balance:  Static Sitting Balance:  Modified Independent  Dynamic Sitting Balance: Modified Independent, for toileting  Static Standing Balance: Supervision  Dynamic Standing Balance: Supervision, wash hands at sink with walker    Bed Mobility:  Rolling to Right: Modified Independent   Supine to Sit: Modified Independent  Scooting: Modified Independent  Cues for log roll techique, HOB Up  Transfers:  Sit to Stand: Supervision  Stand to Sit:Supervision    Ambulation:  Stand By Assistance, to S  Distance: 100'x1, 350'x1  Surface: Level Tile  Device:Rolling Walker  Gait Deviations:  Slow Sadie and no LOB    Negotiated 3 steps with BUE support on 1HR , facing HR, nonreciprocal pattern, SBA, steady and no LOB    Functional Outcome Measures: Completed  AM-PAC Inpatient Mobility Raw Score : 22  AM-PAC Inpatient T-Scale Score : 53.28    ASSESSMENT:  Activity Tolerance:  Patient tolerance of  treatment: good. Treatment Initiated: Treatment and education initiated within context of evaluation. Evaluation time included review of current medical information, gathering information related to past medical, social and functional history, completion of standardized testing, formal and informal observation of tasks, assessment of data and development of plan of care and goals. Treatment time included skilled education and facilitation of tasks to increase safety and independence with functional mobility for improved independence and quality of life. Assessment: Body Structures, Functions, Activity Limitations Requiring Skilled Therapeutic Intervention: Decreased functional mobility , Decreased balance, Decreased strength, Increased pain  Assessment: pt s/p lumbar surgery, Tanika Delgadillo is a 40 y.o. female that presents with ruptured lumbar intervertebral disc. she is ind prior to admission now requiring assist for basic mobility. Pt demonstrates a decrease in baseline by way of bed mobility, transfers and ambulation secondary to decreased activity tolerance, strength, fatigue, and balance deficits.  Pt will benefit from skilled PT services throughout admission and beyond hospital discharge for improvements in functional mobility and in order to decrease fall risk and return pt to PLOF. Therapy Prognosis: Excellent    Requires PT Follow-Up: Yes    Discharge Recommendations:  Discharge Recommendations: Continue to assess pending progress, Home with assist PRN    Patient Education:      . Patient Education  Education Given To: Patient  Education Provided: Role of Therapy, Plan of Care, Precautions, good body mechanics handout  Education Method: Verbal, Printed Information/Hand-outs  Barriers to Learning: None  Education Outcome: Verbalized understanding, Demonstrated understanding       Equipment Recommendations:  Equipment Needed: No    Plan:  Specific Instructions for Next Treatment: therex and mobilty with back precautions  Plan:  (5X O)  Specific Instructions for Next Treatment: therex and mobilty with back precautions    Goals:  Patient goals : go home  Short Term Goals  Time Frame for Short term goals: by discharge  Short term goal 1: bed mobility with I to get in/out of bed  Short term goal 2: transfer with I to get in/out of chairs  Short term goal 3: amb >150'x1 with RW and MOD I to walk safely in home  Short term goal 4: negotiate 3 steps with HR and MOD I to enter home safely  Long Term Goals  Time Frame for Long term goals : no LTGs set secondary to short ELOS    Following session, patient left in safe position with all fall risk precautions in place.

## 2022-09-06 NOTE — PROGRESS NOTES
Johnathanелена Betty 60  INPATIENT OCCUPATIONAL THERAPY  [facility] ORTHOPEDICS 7K  EVALUATION    Time:   Time In: 1100  Time Out: 1125  Timed Code Treatment Minutes: 17 Minutes  Minutes: 25          Date: 2022  Patient Name: Vipul Cat,   Gender: female      MRN: [id number]  : 1985  (40 y.o.)  Referring Practitioner: David Rodriguez PA-C  Diagnosis: Ruptured Lumbar Intervertebral Disc  Additional Pertinent Hx: This 40year old female is s/p REMOVAL OF HARDWARE L4-S1, LUMBAR LAMINECTOMY PLIF L3-L4, PSF L3-S1 WITH SURGALIGN    Restrictions/Precautions:  Restrictions/Precautions: General Precautions  Required Braces or Orthoses  Spinal: Lumbar Corset  Position Activity Restriction  Spinal Precautions: No Bending, No Lifting, No Twisting    Subjective  Chart Reviewed: Yes, Orders, Progress Notes, History and Physical, Operative Notes  Patient assessed for rehabilitation services?: Yes    Subjective: RN okayed OT session. Upon arrival patient was resting in bed. Pt was agreeable to OT session. Pain: /10: Back    Vitals: Vitals not assessed per clinical judgement, see nursing flowsheet    Social/Functional History:  Lives With: Significant other (3 kids)  Type of Home: House  Home Equipment: Walker, rolling   Bathroom Shower/Tub: Tub/Shower unit  Bathroom Toilet: Standard  Bathroom Accessibility: Accessible     ADL Assistance: Independent  Homemaking Assistance: Independent  Homemaking Responsibilities: Yes  Ambulation Assistance: Independent  Transfer Assistance: Independent    Active : Yes        VISION:Corrected    HEARING:  WNL    COGNITION: WNL    RANGE OF MOTION:  Bilateral Upper Extremity:  WFL    STRENGTH:  Bilateral Upper Extremity:  Not Tested    SENSATION:   WFL    ADL:   Upper Extremity Dressing: Supervision and with set-up. Lower Extremity Dressing: Stand By Assistance and with set-up. Moraima Luke BALANCE:  Sitting Balance:  Stand By Assistance. Standing Balance: Stand By Assistance. BED MOBILITY:  Supine to Sit: Stand By Assistance    Scooting: Stand By Assistance      TRANSFERS:  Sit to Stand:  Stand By Assistance. Stand to Sit: Stand By Assistance. FUNCTIONAL MOBILITY:  Assistive Device: Rolling Walker  Assist Level:  Stand By Assistance. Distance:  around unit   Slow pace, No LOB. Activity Tolerance:  Patient tolerance of  treatment: good. Assessment: This 40year old female is s/p back sx. Pt demonstrates decreased balance, decreased endurance. Pt requires skilled OT intervention to increase indep and safety with all self cares, transfers, mobility, and IADLs while maintaining spinal precautions to return to PLOF. Without skilled OT intervention patient is at increased risk for falls, caregiver burden, and hospital readmission after discharge. Performance deficits / Impairments: Decreased functional mobility , Decreased ADL status, Decreased endurance, Decreased balance, Decreased high-level IADLs  Prognosis: Good  REQUIRES OT FOLLOW-UP: Yes    Treatment Initiated: Treatment and education initiated within context of evaluation. Evaluation time included review of current medical information, gathering information related to past medical, social and functional history, completion of standardized testing, formal and informal observation of tasks, assessment of data and development of plan of care and goals. Treatment time included skilled education and facilitation of tasks to increase safety and independence with ADL's for improved functional independence and quality of life.     Discharge Recommendations:  Continue to assess pending progress, Home with assist PRN    Patient Education:     Patient Education  Education Given To: Patient  Education Provided: Role of Therapy, Plan of Care, Precautions, ADL Adaptive Strategies, Transfer Training  Education Method: Demonstration  Barriers to Learning: None  Education Outcome: Continued education needed    Equipment Recommendations:  Equipment Needed: No    Plan:  Times per Week: 5x  Current Treatment Recommendations: Strengthening, Balance training, Functional mobility training, Endurance training, Safety education & training, Patient/Caregiver education & training, Equipment evaluation, education, & procurement, Home management training, Self-Care / ADL. See long-term goal time frame for expected duration of plan of care. If no long-term goals established, a short length of stay is anticipated. Goals:  Patient goals : Go Home with family  Short Term Goals  Time Frame for Short term goals: Until discharge  Short Term Goal 1: Pt will complete BUE light resistive exercises with min vcs for technique to increase indep and endurance with all self cares. Short Term Goal 2: Pt will complete standing tolerance x 5 minutes with 2 UE release and Indep to increase indep and endurance with all sinkside grooming. Short Term Goal 3: Pt will complete functional mobility to/from BR and HH distances Indep to increase indep and endurance with all self cares. Short Term Goal 4: Pt will complete LB dressing with Indep and 0 vcs for safety. Additional Goals?: No         Following session, patient left in safe position with all fall risk precautions in place.

## 2022-09-19 ENCOUNTER — HOSPITAL ENCOUNTER (OUTPATIENT)
Age: 37
Discharge: HOME | End: 2022-09-19
Payer: MEDICARE

## 2022-09-19 LAB
ERYTHROCYTE [DISTWIDTH] IN BLOOD BY AUTOMATED COUNT: 13.9 % (ref 11.5–14.5)
ERYTHROCYTE [DISTWIDTH] IN BLOOD BY AUTOMATED COUNT: 49.1 FL (ref 35–45)
HCT VFR BLD CALC: 38.2 % (ref 37–47)
HEMOGLOBIN: 12.4 GM/DL (ref 12–16)
MCH RBC QN AUTO: 31 PG (ref 26–33)
MCHC RBC AUTO-ENTMCNC: 32.5 GM/DL (ref 32.2–35.5)
MCV RBC AUTO: 95.5 FL (ref 81–99)
PLATELET # BLD: 211 THOU/MM3 (ref 130–400)
PMV BLD AUTO: 11.8 FL (ref 9.4–12.4)
RBC # BLD: 4 MILL/MM3 (ref 4.2–5.4)
WBC # BLD: 8.4 THOU/MM3 (ref 4.8–10.8)

## 2022-09-19 PROCEDURE — 36415 COLL VENOUS BLD VENIPUNCTURE: CPT

## 2022-09-19 PROCEDURE — 85027 COMPLETE CBC AUTOMATED: CPT

## 2022-10-01 NOTE — DISCHARGE SUMMARY
Orthopedic Spine Discharge Summary      Patient Identification:   Maricruz Plata   : 1985  MRN: [id number]   Account: [de-identified]      Patient's PCP: Mya Payne    Admit Date: 2022     Discharge Date: 2022    Admitting Physician: Sil French MD     Discharge Provider: Citlali Olivier PA-C , Dr. Raffaele Sarabia    Discharge Diagnoses:  1. Lumbar spinal stenosis at the L3-L4 level with retrolisthesis. 2.  Status post previous L4 through S1 laminectomy and fusion with L5-S1 PLIF from previous date of surgery of year .  3.  Right lower extremity radiculopathy. The patient was seen and examined on day of discharge and this discharge summary is in conjunction with any daily progress note from day of discharge. Operation Performed:  1. Removal of hardware including pedicle screw, setscrew cap, and bridging alex over levels of L4 through S1.  2.  The assessment of fusion of levels of L4 through S1 with finding of solid bony union. 3.  L3-L4 posterior lumbar interbody fusion and a bilateral fusion at the L3-L4 level. 4.  Foraminotomy of L3-L4 bilateral.  5.  Use of one Medtronic expandable cage of a 9 x 14 mm expanded height placed through a left hand side L3-L4 annulotomy. 6.  Use of the [place] DBM 2 x 10 cm kit mixed with a 5 mL kit of the [place] DBM Matrix. 7.  Use of all local bone cleaned of soft tissue for use in this lumbar spine fusion and run through bone mill. 8.  L5 and S1 bilateral fusion levels of L3-L4.  9. Instrumentation of lumbar spine L3, L4, L5, and S1 with use of the Medtronic Solera pedicle screw fixation system placed bilateral and segmental.      Hospital Course: The patient is 40 y.o. female, who The patient is a 40-year-old female who has presented to our office with complaints of significant low back pain and predominant right-sided lower extremity radicular pain traveling to the posterior buttock, thigh and calf on the right-hand side.   She had her previous operation back in the year 2016, and at that time, underwent an L4 to S1 lumbar decompressive laminectomy and fusion with interbody fusion completed by Dr. Geovanna Martinez. Prior to this in 2010 and 2013, she also had smaller laminectomy type procedures. The patient reports that she had continued to deal with symptoms of right leg radicular pain even after the operation in 2016, as she was unable to give time to recover slowly. She does feel that her symptoms have continued to worsen and she is very limited with her ability to mobilize. There is  retrolisthesis and significant stenosis at L3-4 level adjacent to her  previous surgery. She has also been worked up with EMG study which  ultimately showed evidence of continued L5 radicular pain. Due to failed outpatient conservative therapies, she elected to undergo operative management and underwent surgery at [facility] on 8/31/2022 after which patient was admitted to the [facility] floor for continued monitoring and care. Internal Medicine was consulted for medical management. She did intra-operative durotomy leak and repair and was placed on bedrest.   During the night of postoperative day 0, one of her drains was pulled out accidentally and the second drain was intact and working appropriately. She denied a headache and slowly advanced activity level. On postoperative day 3, she reported a mild headache with nausea as well as urinary retention in which a new guerra was placed and worked appropriately. On postoperative day 4, her drain output increased and had a positional headache. She also complained of abdominal pain and a KUB was completed demonstrated gaseous distention without ileus. On post-operative day 5, she was complaining of a posterior cervical pain and headache.  We had a discussion for continued concern for new or residual spinal fluid leak and was planning to return to OR if her drain output continued to have a positional headache and increased drain output. That day, she worked with therapy and her symptoms overall improved. She was ambulating well with therapy and staff and pain was controlled. She progressed to have a bowel movement and was discharged home with her drain and home health care on 9/6/2022. Throughout the hospital stay, her calves were soft and nontender and strength was maintained at 5/5 bilateral knee extension, dorsiflexion, EHL contraction and plantarflexion. Consults:     IP CONSULT TO HOSPITALIST  IP CONSULT TO HOME CARE NEEDS    Disposition:  home with home health care    Condition at Discharge: Stable    Discharge Medications:   1. Flexeril  2. Percocet  3. Zofran-ODT  4. Senokot-S  5. Lotrimin cream  6. Vistaril        Discharge Instructions:  No heavy lifting, bending, twisting or vigorous activity. Patient is to take frequent walks to stimulate healing of the low back and strengthening of the legs. Discontinue the use of NSAIDS for 6 weeks after drain removal. No driving until seen back in the office. Patient has a follow up with us in the office in about two weeks. At that time, we will evaluate with lumbar spine x-rays and assess clinical recovery progress.         Electronically signed by Candelario Mahajan PA-C on 10/1/2022

## 2023-05-01 ENCOUNTER — HOSPITAL ENCOUNTER (EMERGENCY)
Age: 38
Discharge: HOME | End: 2023-05-01
Attending: FAMILY MEDICINE
Payer: COMMERCIAL

## 2023-05-01 VITALS
HEART RATE: 100 BPM | OXYGEN SATURATION: 99 % | DIASTOLIC BLOOD PRESSURE: 86 MMHG | HEIGHT: 69 IN | SYSTOLIC BLOOD PRESSURE: 122 MMHG | RESPIRATION RATE: 16 BRPM | WEIGHT: 190 LBS | TEMPERATURE: 97.7 F | BODY MASS INDEX: 28.14 KG/M2

## 2023-05-01 DIAGNOSIS — L03.211 FACIAL CELLULITIS: Primary | ICD-10-CM

## 2023-05-01 PROCEDURE — 99283 EMERGENCY DEPT VISIT LOW MDM: CPT

## 2023-05-01 RX ORDER — CEPHALEXIN 500 MG/1
500 CAPSULE ORAL 4 TIMES DAILY
Qty: 28 CAPSULE | Refills: 0 | Status: SHIPPED | OUTPATIENT
Start: 2023-05-01 | End: 2023-05-08

## 2023-05-01 RX ORDER — SULFAMETHOXAZOLE AND TRIMETHOPRIM 800; 160 MG/1; MG/1
1 TABLET ORAL 2 TIMES DAILY
Qty: 14 TABLET | Refills: 0 | Status: SHIPPED | OUTPATIENT
Start: 2023-05-01 | End: 2023-05-08

## 2023-05-01 ASSESSMENT — ENCOUNTER SYMPTOMS
NAUSEA: 0
FACIAL SWELLING: 1
VOMITING: 0

## 2023-05-01 ASSESSMENT — PAIN DESCRIPTION - ORIENTATION
ORIENTATION: RIGHT
ORIENTATION: RIGHT;LOWER

## 2023-05-01 ASSESSMENT — PAIN SCALES - GENERAL
PAINLEVEL_OUTOF10: 8
PAINLEVEL_OUTOF10: 9

## 2023-05-01 ASSESSMENT — PAIN DESCRIPTION - LOCATION
LOCATION: FACE
LOCATION: FACE

## 2023-05-01 ASSESSMENT — PAIN - FUNCTIONAL ASSESSMENT
PAIN_FUNCTIONAL_ASSESSMENT: 0-10
PAIN_FUNCTIONAL_ASSESSMENT: 0-10

## 2023-05-01 ASSESSMENT — PAIN DESCRIPTION - PAIN TYPE
TYPE: ACUTE PAIN
TYPE: ACUTE PAIN

## 2023-05-01 ASSESSMENT — PAIN DESCRIPTION - DESCRIPTORS
DESCRIPTORS: ACHING
DESCRIPTORS: ACHING

## 2023-05-01 NOTE — ED NOTES
Pt pink, warm and dry, breathing with ease. Prescriptions explained, pt states understanding. AVS reviewed. Denies questions or concerns. Pt remains alert and oriented. Pt discharged in stable condition.        Galo Patel RN  05/01/23 8089

## 2023-05-01 NOTE — ED PROVIDER NOTES
brother is alive. She indicated that the status of her maternal grandmother is unknown. She indicated that the status of her maternal grandfather is unknown.   family history includes Heart Disease in her maternal grandfather; High Blood Pressure in her maternal grandmother; Other in her maternal grandmother. SOCIAL HISTORY      reports that she has been smoking cigarettes. She has a 6.00 pack-year smoking history. She has never used smokeless tobacco. She reports that she does not drink alcohol and does not use drugs. PHYSICAL EXAM     INITIAL VITALS:  height is 5' 9\" (1.753 m) and weight is 190 lb (86.2 kg). Her temporal temperature is 97.7 °F (36.5 °C). Her blood pressure is 122/86 and her pulse is 100. Her respiration is 16 and oxygen saturation is 99%. Physical Exam  Vitals and nursing note reviewed. Constitutional:       General: She is not in acute distress. HENT:      Mouth/Throat:      Pharynx: Oropharynx is clear. No oropharyngeal exudate or posterior oropharyngeal erythema. Comments: Small area of swelling just adjacent to right lower lateral lip. Skin:     Findings: Erythema and rash present. Neurological:      Mental Status: She is alert. DIFFERENTIAL DIAGNOSIS:       DIAGNOSTIC RESULTS         LABS:   Labs Reviewed - No data to display    EMERGENCY DEPARTMENT COURSE:   Vitals:    Vitals:    05/01/23 1815   BP: 122/86   Pulse: 100   Resp: 16   Temp: 97.7 °F (36.5 °C)   TempSrc: Temporal   SpO2: 99%   Weight: 190 lb (86.2 kg)   Height: 5' 9\" (1.753 m)     Small non fluctuant area next to corner of right lower lip. Will recommend heat application. Patient will be placed on antibiotics for skin fabien coverage. Follow up if abscess formation develops for Incision and drainage. PROCEDURES:  None    FINAL IMPRESSION      1. Facial cellulitis          DISPOSITION/PLAN   Home. Heat applied to involved area. Keflex and bactrim as prescribed.  Return if worsening abscess

## 2023-05-03 ENCOUNTER — HOSPITAL ENCOUNTER (EMERGENCY)
Age: 38
Discharge: HOME | End: 2023-05-03
Attending: FAMILY MEDICINE
Payer: COMMERCIAL

## 2023-05-03 VITALS
OXYGEN SATURATION: 99 % | HEART RATE: 74 BPM | TEMPERATURE: 98.1 F | RESPIRATION RATE: 17 BRPM | DIASTOLIC BLOOD PRESSURE: 96 MMHG | SYSTOLIC BLOOD PRESSURE: 134 MMHG

## 2023-05-03 DIAGNOSIS — L02.01 FACIAL ABSCESS: Primary | ICD-10-CM

## 2023-05-03 PROCEDURE — 99282 EMERGENCY DEPT VISIT SF MDM: CPT

## 2023-05-03 PROCEDURE — 10060 I&D ABSCESS SIMPLE/SINGLE: CPT

## 2023-05-03 RX ORDER — METHYLPREDNISOLONE 4 MG/1
TABLET ORAL
COMMUNITY
Start: 2023-04-21

## 2023-05-03 RX ORDER — HYDROCODONE BITARTRATE AND ACETAMINOPHEN 5; 325 MG/1; MG/1
1 TABLET ORAL EVERY 6 HOURS PRN
COMMUNITY
Start: 2023-04-21

## 2023-05-03 RX ORDER — PREDNISONE 20 MG/1
TABLET ORAL
COMMUNITY
Start: 2023-01-31

## 2023-05-03 ASSESSMENT — PAIN DESCRIPTION - ORIENTATION: ORIENTATION: RIGHT

## 2023-05-03 ASSESSMENT — PAIN DESCRIPTION - LOCATION: LOCATION: FACE

## 2023-05-03 ASSESSMENT — PAIN - FUNCTIONAL ASSESSMENT: PAIN_FUNCTIONAL_ASSESSMENT: 0-10

## 2023-05-03 ASSESSMENT — PAIN DESCRIPTION - DESCRIPTORS: DESCRIPTORS: ACHING

## 2023-05-03 ASSESSMENT — PAIN SCALES - GENERAL: PAINLEVEL_OUTOF10: 8

## 2023-05-03 NOTE — ED PROVIDER NOTES
Findings: No erythema. Comments: Small non fluctuant abscess noted just lateral to corner of right mouth. Neurological:      Mental Status: She is alert. DIFFERENTIAL DIAGNOSIS:       DIAGNOSTIC RESULTS         LABS:   Labs Reviewed - No data to display    EMERGENCY DEPARTMENT COURSE:   Vitals:    Vitals:    05/03/23 1549 05/03/23 1638   BP: (!) 134/96    Pulse:  74   Resp: 17    Temp: 98.1 °F (36.7 °C)    TempSrc: Temporal    SpO2: 99%      Small abscess noted to right face. Discussed options including attempts to I and D area. Limited in regards to purulent matter removed. Will recommend general surgery if more aggressive attempt. Continue heat application. Avoid expressing area. Continue antibiotics as prescribed. PROCEDURES:  Patient Name: Janet Ricketts   Medical Record Number: [id number]  Date: 5/3/2023   Time: 5:09 PM   Room/Bed: E1/E1  Incision and Drainage Procedure Note  Indication: Abscess    Procedure: The patient was positioned appropriately and the skin over the incision site was prepped with Shur-Clens and alcohol. Local anesthesia was obtained by infiltration using 1% Lidocaine without epinephrine. An incision was then made over the greatest area of fluctuance and approximately 1 cc of purulent material was expressed. Loculations were not present. The drainage cavity was then irrigated and dressed with a bandage. The patients tetanus status was up to date and did not require a booster dose. The patient tolerated the procedure well. Complications: None    Electronically Signed by: [email or web address]    FINAL IMPRESSION      1. Facial abscess          DISPOSITION/PLAN   Home. Continue antibiotics as prescribed. If symptoms worsen than follow up with General surgery regarding abscess incision and drainage.      PATIENT REFERRED TO:  Giuliano Lugo MD  [place] [address]  [phone]    Schedule an appointment as soon as possible for a visit   If

## 2023-05-03 NOTE — ED NOTES
Patient in stable condition. Alert and oriented x3. Unlabored breathing present. Patient aware of plan of care. Patient discharge instructions given and explained. Follow up information instructions given. Patient agreeable to plan of care. Patient states understanding and denies any questions or concerns. Patient ambulated out of ER with no complications.         Oxana Butterfield RN  05/03/23 5224

## 2023-05-03 NOTE — ED TRIAGE NOTES
Patient arrival to the ER ambulatory to room 1 with complaint of right cheek swelling / abscess. A couple days ago she was here and was told to come back if it did not get better she states. Patient states \"it is not getting worse and not getting better it is painful\". Patient rates pain a 8 out of 10. Patient is alert and oriented and breathing with ease. Skin is warm pink and dry. Right cheek is swollen and pink. Patient is taking her antibiotics. Vitals as documented.

## 2023-08-18 ENCOUNTER — APPOINTMENT (OUTPATIENT)
Dept: GENERAL RADIOLOGY | Age: 38
End: 2023-08-18
Payer: COMMERCIAL

## 2023-08-18 ENCOUNTER — HOSPITAL ENCOUNTER (EMERGENCY)
Age: 38
Discharge: HOME | End: 2023-08-18
Attending: EMERGENCY MEDICINE
Payer: COMMERCIAL

## 2023-08-18 VITALS
TEMPERATURE: 98.8 F | SYSTOLIC BLOOD PRESSURE: 122 MMHG | RESPIRATION RATE: 18 BRPM | DIASTOLIC BLOOD PRESSURE: 84 MMHG | OXYGEN SATURATION: 97 % | HEART RATE: 97 BPM

## 2023-08-18 DIAGNOSIS — J18.9 PNEUMONIA OF RIGHT MIDDLE LOBE DUE TO INFECTIOUS ORGANISM: Primary | ICD-10-CM

## 2023-08-18 LAB
FLUAV AG SPEC QL: NEGATIVE
FLUBV AG SPEC QL: NEGATIVE
SARS-COV-2 RDRP RESP QL NAA+PROBE: NOT  DETECTED

## 2023-08-18 PROCEDURE — 87804 INFLUENZA ASSAY W/OPTIC: CPT

## 2023-08-18 PROCEDURE — 87635 SARS-COV-2 COVID-19 AMP PRB: CPT

## 2023-08-18 PROCEDURE — 99284 EMERGENCY DEPT VISIT MOD MDM: CPT

## 2023-08-18 PROCEDURE — 71046 X-RAY EXAM CHEST 2 VIEWS: CPT

## 2023-08-18 RX ORDER — ALBUTEROL SULFATE 90 UG/1
2 AEROSOL, METERED RESPIRATORY (INHALATION) 4 TIMES DAILY PRN
Qty: 54 G | Refills: 1 | Status: SHIPPED | OUTPATIENT
Start: 2023-08-18

## 2023-08-18 RX ORDER — DOXYCYCLINE HYCLATE 100 MG
100 TABLET ORAL 2 TIMES DAILY
Qty: 10 TABLET | Refills: 0 | Status: SHIPPED | OUTPATIENT
Start: 2023-08-18 | End: 2023-08-23

## 2023-08-18 ASSESSMENT — PAIN DESCRIPTION - LOCATION: LOCATION: GENERALIZED

## 2023-08-18 ASSESSMENT — PAIN - FUNCTIONAL ASSESSMENT: PAIN_FUNCTIONAL_ASSESSMENT: 0-10

## 2023-08-18 ASSESSMENT — PAIN DESCRIPTION - DESCRIPTORS: DESCRIPTORS: ACHING

## 2023-08-18 ASSESSMENT — PAIN SCALES - GENERAL: PAINLEVEL_OUTOF10: 8

## 2023-08-18 NOTE — DISCHARGE INSTRUCTIONS
You were seen in the emergency department today for pneumonia. Take antibiotics as directed. Use the inhaler as needed for wheezing or shortness of breath. Return to the emergency department if you experience worsening shortness of breath, chest pain, high fevers or if you have any other concerns.   Follow-up with your PCP in 3 days

## 2023-08-18 NOTE — ED NOTES
Pt given discharge instructions. Verbalized understanding and use of meds. Pt left ambulatory per self. Pt in stable condition.       Fernando Petit RN  08/18/23 9599 Detail Level: Detailed

## 2023-08-18 NOTE — ED TRIAGE NOTES
Pt arrival to the ER with complaint of a cold she has had for a week with a cough body aches and muscle pain on the left side she is unsure if she pulled a muscle coughing. Patient daughter at home is also sick. Patient has tried OTC medication and her inhaler. Pt is alert and oriented and breathing with ease at this time.

## 2023-11-07 PROBLEM — O14.10 PREECLAMPSIA, SEVERE: Status: ACTIVE | Noted: 2019-09-18

## 2023-11-07 PROBLEM — R10.9 ABDOMINAL PAIN: Status: RESOLVED | Noted: 2019-08-27 | Resolved: 2023-11-07

## 2023-11-07 ASSESSMENT — ENCOUNTER SYMPTOMS
CONSTIPATION: 0
DIARRHEA: 0
VOMITING: 0
SHORTNESS OF BREATH: 0
NAUSEA: 0
COUGH: 1

## 2023-11-07 NOTE — PROGRESS NOTES
[address] [facility] MEDICINE  [address] [id number] [place]     Sanjay Mackay is a 45 y.o. female who presents today for:  Chief Complaint   Patient presents with    Cough     Wet cough, runny nose, 2 weeks, body aches  Denies fevers   Has tried robitussin       Assessment/Plan:     Citlali Dickey was seen today for cough. Diagnoses and all orders for this visit:    Acute cough  -     Cancel: XR CHEST STANDARD (2 VW)  -     predniSONE (DELTASONE) 20 MG tablet; Take 2 tablets by mouth daily for 5 days  -     XR CHEST STANDARD (2 VW); Future  -     XR CHEST STANDARD (2 VW)    Cough: Acute/uncontrolled, Will get CXR as above to r/o PNA  Recommended continued use of albuterol inhaler and add Flonase 2 puffs per nostril nightly. Recommended honey, increase hydration, humidifier, warm steamy showers to help with cough. Otherwise take prednisone as prescribed and let us know if symptoms have worsened or have not improved after course. No follow-ups on file. Medications Prescribed:  Orders Placed This Encounter   Medications    predniSONE (DELTASONE) 20 MG tablet     Sig: Take 2 tablets by mouth daily for 5 days     Dispense:  10 tablet     Refill:  0       Future Appointments   Date Time Provider [address]   11/8/2023 10:00 AM Lakeisha Light MD [facility]   11/8/2023 10:05 AM [facility] [place] XR [facility]       HPI:     HPI  Patient with past medical history significant for anxiety/depression, blood clots with prothrombin gene mutation presents for acute visit for cough. Has had wet cough, runny nose for 2 weeks. Has had body aches but no fevers. Trying Robitussin over-the-counter without much relief. States nasal discharge was yellow now clear. Is a smoker. Did not do any at home COVID or flu testing. Daughter is also sick. Denies nausea, vomiting, diarrhea, constipation.   Still eating and

## 2023-11-08 ENCOUNTER — OFFICE VISIT (OUTPATIENT)
Dept: FAMILY MEDICINE CLINIC | Age: 38
End: 2023-11-08
Payer: COMMERCIAL

## 2023-11-08 VITALS
OXYGEN SATURATION: 94 % | HEART RATE: 98 BPM | WEIGHT: 188 LBS | BODY MASS INDEX: 27.85 KG/M2 | SYSTOLIC BLOOD PRESSURE: 112 MMHG | RESPIRATION RATE: 16 BRPM | DIASTOLIC BLOOD PRESSURE: 64 MMHG | HEIGHT: 69 IN

## 2023-11-08 DIAGNOSIS — R05.1 ACUTE COUGH: Primary | ICD-10-CM

## 2023-11-08 PROCEDURE — 4004F PT TOBACCO SCREEN RCVD TLK: CPT | Performed by: STUDENT IN AN ORGANIZED HEALTH CARE EDUCATION/TRAINING PROGRAM

## 2023-11-08 PROCEDURE — G8427 DOCREV CUR MEDS BY ELIG CLIN: HCPCS | Performed by: STUDENT IN AN ORGANIZED HEALTH CARE EDUCATION/TRAINING PROGRAM

## 2023-11-08 PROCEDURE — G8484 FLU IMMUNIZE NO ADMIN: HCPCS | Performed by: STUDENT IN AN ORGANIZED HEALTH CARE EDUCATION/TRAINING PROGRAM

## 2023-11-08 PROCEDURE — G8419 CALC BMI OUT NRM PARAM NOF/U: HCPCS | Performed by: STUDENT IN AN ORGANIZED HEALTH CARE EDUCATION/TRAINING PROGRAM

## 2023-11-08 PROCEDURE — 99214 OFFICE O/P EST MOD 30 MIN: CPT | Performed by: STUDENT IN AN ORGANIZED HEALTH CARE EDUCATION/TRAINING PROGRAM

## 2023-11-08 RX ORDER — PREDNISONE 20 MG/1
40 TABLET ORAL DAILY
Qty: 10 TABLET | Refills: 0 | Status: SHIPPED | OUTPATIENT
Start: 2023-11-08 | End: 2023-11-13

## 2023-11-08 SDOH — ECONOMIC STABILITY: HOUSING INSECURITY
IN THE LAST 12 MONTHS, WAS THERE A TIME WHEN YOU DID NOT HAVE A STEADY PLACE TO SLEEP OR SLEPT IN A SHELTER (INCLUDING NOW)?: NO

## 2023-11-08 SDOH — ECONOMIC STABILITY: INCOME INSECURITY: HOW HARD IS IT FOR YOU TO PAY FOR THE VERY BASICS LIKE FOOD, HOUSING, MEDICAL CARE, AND HEATING?: NOT HARD AT ALL

## 2023-11-08 SDOH — ECONOMIC STABILITY: FOOD INSECURITY: WITHIN THE PAST 12 MONTHS, THE FOOD YOU BOUGHT JUST DIDN'T LAST AND YOU DIDN'T HAVE MONEY TO GET MORE.: NEVER TRUE

## 2023-11-08 SDOH — ECONOMIC STABILITY: FOOD INSECURITY: WITHIN THE PAST 12 MONTHS, YOU WORRIED THAT YOUR FOOD WOULD RUN OUT BEFORE YOU GOT MONEY TO BUY MORE.: NEVER TRUE

## 2023-11-08 ASSESSMENT — PATIENT HEALTH QUESTIONNAIRE - PHQ9: DEPRESSION UNABLE TO ASSESS: PT REFUSES

## 2023-11-08 NOTE — PATIENT INSTRUCTIONS
Recommend continued use of albuterol inhaler and add Flonase 2 puffs per nostril nightly. Recommend honey, increase hydration, humidifier, warm steamy showers to help with cough. Otherwise take prednisone as prescribed and let us know if symptoms have worsened or have not improved after course.

## 2024-03-27 ENCOUNTER — HOSPITAL ENCOUNTER (EMERGENCY)
Age: 39
Discharge: HOME | End: 2024-03-27
Attending: STUDENT IN AN ORGANIZED HEALTH CARE EDUCATION/TRAINING PROGRAM
Payer: COMMERCIAL

## 2024-03-27 VITALS
BODY MASS INDEX: 27.11 KG/M2 | HEART RATE: 84 BPM | HEIGHT: 69 IN | DIASTOLIC BLOOD PRESSURE: 78 MMHG | RESPIRATION RATE: 16 BRPM | TEMPERATURE: 97.9 F | OXYGEN SATURATION: 96 % | SYSTOLIC BLOOD PRESSURE: 113 MMHG | WEIGHT: 183 LBS

## 2024-03-27 DIAGNOSIS — L50.9 URTICARIA: Primary | ICD-10-CM

## 2024-03-27 PROCEDURE — 99283 EMERGENCY DEPT VISIT LOW MDM: CPT

## 2024-03-27 RX ORDER — TRIAMCINOLONE ACETONIDE 1 MG/G
CREAM TOPICAL
Qty: 1 EACH | Refills: 0 | Status: SHIPPED | OUTPATIENT
Start: 2024-03-27 | End: 2024-04-03

## 2024-03-27 RX ORDER — PREDNISONE 20 MG/1
20 TABLET ORAL DAILY
Qty: 5 TABLET | Refills: 0 | Status: SHIPPED | OUTPATIENT
Start: 2024-03-27 | End: 2024-04-01

## 2024-03-27 ASSESSMENT — PAIN - FUNCTIONAL ASSESSMENT: PAIN_FUNCTIONAL_ASSESSMENT: NONE - DENIES PAIN

## 2024-03-27 NOTE — ED NOTES
Pt complains of a rash on her face and neck this am that has progressed toward her arm. Pt states she took a benadryl around 0900 and 1300 without relief. Pt alert, resp even and unlabored, skin pink, warm and dry. Fine pin point rash noted on neck.

## 2024-03-27 NOTE — DISCHARGE INSTRUCTIONS
Take your medication as indicated and prescribed.  Use Benadryl 25 - 50 milligrams (1 - 2 tabs) every 6 hours for the next 24 - 48 hours (do not use if you were given a prescription for hydroxyzine).  If you were given a prescription for prednisone or any other steroid then, take Pepcid (famotidine - over the counter) every day while you are taking the steroids.  If you are a diabetic, you should check your blood sugar more frequently while taking prednisone.      Do not use the steroid medications on your face or in your private area unless specifically told that you can.     PLEASE RETURN TO THE EMERGENCY DEPARTMENT IMMEDIATELY for worsening symptoms, burn type appearance to your skin with skin coming off, white drainage from any of the wounds, redness with streaking, or if you develop any concerning symptoms such as: high fever not relieved by acetaminophen (Tylenol) and/or ibuprofen (Motrin / Advil), chills, shortness of breath, chest pain, feeling of your heart fluttering or racing, persistent nausea and/or vomiting, vomiting up blood, blood in your stool, loss of consciousness, numbness, weakness or tingling in the arms or legs or change in color of the extremities, changes in mental status, persistent headache, blurry vision, loss of bladder / bowel control, unable to follow up with your physician, or other any other care or concern.

## 2024-03-27 NOTE — ED PROVIDER NOTES
STATUS UNKNOWN) N/A 03/22/2021    HYSTERECTOMY ABDOMINAL LAPAROSCOPIC ROBOTIC, WITH BILATERAL SALPINGECTOMY performed by Debbie Jeffrey DO at [facility] OR    HYSTERECTOMY, TOTAL ABDOMINAL (CERVIX REMOVED)      LUMBAR FUSION N/A 08/31/2022    REMOVAL OF HARDWARE L4-S1, LUMBAR LAMINECTOMY PLIF L3-L4, PSF L3-S1 WITH SURGALIGN performed by Chai Magallanes MD at [facility] OR    SINUS SURGERY  04/21/2023    [facility]    TONSILLECTOMY      UTERINE SUSPENSION      WISDOM TOOTH EXTRACTION         CURRENT MEDICATIONS     Previous Medications    ALBUTEROL SULFATE HFA (VENTOLIN HFA) 108 (90 BASE) MCG/ACT INHALER    Inhale 2 puffs into the lungs 4 times daily as needed for Wheezing       ALLERGIES     Allergies   Allergen Reactions    Adhesive Tape Other (See Comments) and Rash     Redness with plastic tape    Morphine Nausea And Vomiting       FAMILY HISTORY     Family History   Problem Relation Age of Onset    High Blood Pressure Maternal Grandmother     Other Maternal Grandmother         Chrohns    Heart Disease Maternal Grandfather        SOCIAL HISTORY     Social History     Tobacco Use    Smoking status: Every Day     Current packs/day: 1.00     Average packs/day: 1 pack/day for 12.0 years (12.0 ttl pk-yrs)     Types: Cigarettes    Smokeless tobacco: Never   Vaping Use    Vaping Use: Never used   Substance Use Topics    Alcohol use: Yes     Comment: occasionally    Drug use: No       PHYSICAL EXAM     ED Triage Vitals [03/27/24 1402]   BP Temp Temp Source Pulse Respirations SpO2 Height Weight - Scale   113/78 97.9 °F (36.6 °C) Tympanic 84 16 96 % 1.753 m (5' 9\") 83 kg (183 lb)       Initial vital signs and nursing assessment reviewed and normal. Body mass index is 27.02 kg/m².     Vitals:    03/27/24 1402   BP: 113/78   Pulse: 84   Resp: 16   Temp: 97.9 °F (36.6 °C)   TempSrc: Tympanic   SpO2: 96%   Weight: 83 kg (183 lb)   Height: 1.753 m (5' 9\")       Physical Exam  Vitals reviewed.   Constitutional:       Appearance:

## 2024-03-27 NOTE — DISCHARGE INSTR - COC
Continuity of Care Form    Patient Name: Lona Oviedo   :  1985  MRN:  [id number]    Admit date:  3/27/2024  Discharge date:  ***    Code Status Order: Prior   Advance Directives:     Admitting Physician:  No admitting provider for patient encounter.  PCP: No primary care provider on file.    Discharging Nurse: ***  Discharging Hospital Unit/Room#: E2/E2  Discharging Unit Phone Number: ***    Emergency Contact:   Extended Emergency Contact Information  Primary Emergency Contact: Nanette MosqueraeYaquelin Tse  Address: [address] [place] [id number] [facility]  Home Phone: [phone]  Relation: Parent  Secondary Emergency Contact: Oleg Cherry   [facility]  Home Phone: [phone]  Relation: Domestic Partner    Past Surgical History:  Past Surgical History:   Procedure Laterality Date    BACK SURGERY      BACK SURGERY      BACK SURGERY       SECTION  2013     SECTION  2019    HEMORRHOID SURGERY      [facility]    HYSTERECTOMY (CERVIX STATUS UNKNOWN) N/A 2021    HYSTERECTOMY ABDOMINAL LAPAROSCOPIC ROBOTIC, WITH BILATERAL SALPINGECTOMY performed by Debbie Jeffrey DO at [facility] OR    HYSTERECTOMY, TOTAL ABDOMINAL (CERVIX REMOVED)      LUMBAR FUSION N/A 2022    REMOVAL OF HARDWARE L4-S1, LUMBAR LAMINECTOMY PLIF L3-L4, PSF L3-S1 WITH SURGALIGN performed by Chai Magallanes MD at [facility] OR    SINUS SURGERY  2023    [facility]    TONSILLECTOMY      UTERINE SUSPENSION      WISDOM TOOTH EXTRACTION         Immunization History:   Immunization History   Administered Date(s) Administered    COVID-19, PFIZER PURPLE top, DILUTE for use, (age 12 y+), 30mcg/0.3mL 2021, 10/13/2021    TDaP, ADACEL (age 10y-64y), BOOSTRIX (age 10y+), IM, 0.5mL 10/08/2019       Active Problems:  Patient Active Problem List   Diagnosis Code    Orthostatic lightheadedness R42    Depression with anxiety F41.8    Spinal

## 2024-04-03 NOTE — PROGRESS NOTES
1328- pt to pacu, moaning in pain. Resp easy, unlabored on room air. Pt appears in no acute medical distress. 1349- pt continues to report significant pain, moaning out, tearful. Medicated per orders. 1400-pt reports pain slightly improving, resting with eyes closed in bed. No tearful at this time. 1410- pt reports pain continues, slightly improved. Appears more comfortable at this time. Pt reports she does not tolerate pain well and just wants to see her mom. 1416- pt reports pain tolerable at this time    1418- report called to [facility]     1430- pt meets criteria for discharge from pacu, awaiting transport    1436- pt assisted to reposition in bed.      1440- pt to [facility] The ECG shows a sinus rhythm and a sinus bradycardia. ECG rate  = 59 bpm.

## 2024-11-05 ENCOUNTER — HOSPITAL ENCOUNTER (EMERGENCY)
Age: 39
Discharge: HOME | End: 2024-11-05
Attending: EMERGENCY MEDICINE
Payer: COMMERCIAL

## 2024-11-05 VITALS
RESPIRATION RATE: 18 BRPM | SYSTOLIC BLOOD PRESSURE: 128 MMHG | OXYGEN SATURATION: 99 % | DIASTOLIC BLOOD PRESSURE: 85 MMHG | HEART RATE: 98 BPM

## 2024-11-05 DIAGNOSIS — J01.91 ACUTE RECURRENT SINUSITIS, UNSPECIFIED LOCATION: Primary | ICD-10-CM

## 2024-11-05 PROCEDURE — 99283 EMERGENCY DEPT VISIT LOW MDM: CPT

## 2024-11-05 RX ORDER — AMOXICILLIN 500 MG/1
500 CAPSULE ORAL 2 TIMES DAILY
Qty: 20 CAPSULE | Refills: 0 | Status: SHIPPED | OUTPATIENT
Start: 2024-11-05 | End: 2024-11-15

## 2024-11-05 ASSESSMENT — PAIN - FUNCTIONAL ASSESSMENT: PAIN_FUNCTIONAL_ASSESSMENT: NONE - DENIES PAIN

## 2024-11-05 NOTE — ED TRIAGE NOTES
Pt arrival to the ER with complaint of head congestion, drainage, ear pain and cough. States its been ongoing for two weeks. Daughter has surgery Monday. Denies pain.

## 2024-11-05 NOTE — DISCHARGE INSTRUCTIONS
Continue home medications.  Try over-the-counter cough or cold medication.  Continue nasal rinse.  Take amoxicillin twice a day.  Avoid close contacts.

## 2024-11-05 NOTE — ED PROVIDER NOTES
[facility]  [address] [place] [id number]  Phone: [phone]  EMERGENCY DEPARTMENT ENCOUNTER      Pt Name: Lona Oviedo  MRN: [id number]  Birthdate 1985  Date of evaluation: 2024  Provider: Rashaad Langford MD    CHIEF COMPLAINT       Chief Complaint   Patient presents with    Nasal Congestion    Sinusitis    Ear Pain         HISTORY OF PRESENT ILLNESS      Lona Oviedo is a 39 y.o. female who presents to the emergency department with above-noted complaint.   Patient is developed sinus congestion has been going on for about 10 days.  Been treating with over-the-counter medication without relief.  She has ear pressure and and discomfort.  She was concern for infection and as her daughter is having surgery        REVIEW OF SYSTEMS     Positive for sinus pressure congestion and ear pain.  No vomiting  Review of Systems  All systems negative except as marked.     PAST MEDICAL HISTORY     Past Medical History:   Diagnosis Date    Anxiety     Blood clotting disorder (HCC)     Chronic back pain     Depression     Headache     Heterozygous hemoglobin S (HCC)     only with pregnancy    Hx of blood clots     umbilical cord during pregnancy    Laceration 2012    left wrist    PONV (postoperative nausea and vomiting)     Unspecified diseases of blood and blood-forming organs     hypothrombin mutation          SURGICAL HISTORY       Past Surgical History:   Procedure Laterality Date    BACK SURGERY      BACK SURGERY      BACK SURGERY  2016     SECTION  2013     SECTION  2019    HEMORRHOID SURGERY      [facility]    HYSTERECTOMY (CERVIX STATUS UNKNOWN) N/A 2021    HYSTERECTOMY ABDOMINAL LAPAROSCOPIC ROBOTIC, WITH BILATERAL SALPINGECTOMY performed by Debbie Jeffrey DO at [facility] OR    HYSTERECTOMY, TOTAL ABDOMINAL (CERVIX REMOVED)      LUMBAR FUSION N/A 2022    REMOVAL OF HARDWARE L4-S1, LUMBAR

## 2024-11-13 SDOH — HEALTH STABILITY: PHYSICAL HEALTH: ON AVERAGE, HOW MANY DAYS PER WEEK DO YOU ENGAGE IN MODERATE TO STRENUOUS EXERCISE (LIKE A BRISK WALK)?: 2 DAYS

## 2024-11-13 SDOH — HEALTH STABILITY: PHYSICAL HEALTH: ON AVERAGE, HOW MANY MINUTES DO YOU ENGAGE IN EXERCISE AT THIS LEVEL?: 20 MIN

## 2024-11-14 ENCOUNTER — OFFICE VISIT (OUTPATIENT)
Dept: FAMILY MEDICINE CLINIC | Age: 39
End: 2024-11-14
Payer: COMMERCIAL

## 2024-11-14 VITALS
HEART RATE: 90 BPM | TEMPERATURE: 98.1 F | WEIGHT: 165 LBS | OXYGEN SATURATION: 100 % | BODY MASS INDEX: 24.44 KG/M2 | SYSTOLIC BLOOD PRESSURE: 118 MMHG | RESPIRATION RATE: 16 BRPM | HEIGHT: 69 IN | DIASTOLIC BLOOD PRESSURE: 74 MMHG

## 2024-11-14 DIAGNOSIS — J01.00 ACUTE NON-RECURRENT MAXILLARY SINUSITIS: Primary | ICD-10-CM

## 2024-11-14 DIAGNOSIS — F41.8 DEPRESSION WITH ANXIETY: ICD-10-CM

## 2024-11-14 DIAGNOSIS — D68.52 PROTHROMBIN GENE MUTATION (HCC): ICD-10-CM

## 2024-11-14 PROBLEM — O14.10 PREECLAMPSIA, SEVERE: Status: RESOLVED | Noted: 2019-09-18 | Resolved: 2024-11-14

## 2024-11-14 PROCEDURE — G8420 CALC BMI NORM PARAMETERS: HCPCS

## 2024-11-14 PROCEDURE — 4004F PT TOBACCO SCREEN RCVD TLK: CPT

## 2024-11-14 PROCEDURE — 99203 OFFICE O/P NEW LOW 30 MIN: CPT

## 2024-11-14 PROCEDURE — G8484 FLU IMMUNIZE NO ADMIN: HCPCS

## 2024-11-14 PROCEDURE — G8427 DOCREV CUR MEDS BY ELIG CLIN: HCPCS

## 2024-11-14 RX ORDER — METHYLPREDNISOLONE 4 MG/1
TABLET ORAL
Qty: 1 KIT | Refills: 0 | Status: SHIPPED | OUTPATIENT
Start: 2024-11-14 | End: 2024-11-20

## 2024-11-14 SDOH — ECONOMIC STABILITY: INCOME INSECURITY: HOW HARD IS IT FOR YOU TO PAY FOR THE VERY BASICS LIKE FOOD, HOUSING, MEDICAL CARE, AND HEATING?: NOT HARD AT ALL

## 2024-11-14 SDOH — ECONOMIC STABILITY: FOOD INSECURITY: WITHIN THE PAST 12 MONTHS, THE FOOD YOU BOUGHT JUST DIDN'T LAST AND YOU DIDN'T HAVE MONEY TO GET MORE.: NEVER TRUE

## 2024-11-14 SDOH — ECONOMIC STABILITY: FOOD INSECURITY: WITHIN THE PAST 12 MONTHS, YOU WORRIED THAT YOUR FOOD WOULD RUN OUT BEFORE YOU GOT MONEY TO BUY MORE.: NEVER TRUE

## 2024-11-14 ASSESSMENT — PATIENT HEALTH QUESTIONNAIRE - PHQ9
SUM OF ALL RESPONSES TO PHQ QUESTIONS 1-9: 9
1. LITTLE INTEREST OR PLEASURE IN DOING THINGS: SEVERAL DAYS
9. THOUGHTS THAT YOU WOULD BE BETTER OFF DEAD, OR OF HURTING YOURSELF: SEVERAL DAYS
SUM OF ALL RESPONSES TO PHQ QUESTIONS 1-9: 9
6. FEELING BAD ABOUT YOURSELF - OR THAT YOU ARE A FAILURE OR HAVE LET YOURSELF OR YOUR FAMILY DOWN: SEVERAL DAYS
4. FEELING TIRED OR HAVING LITTLE ENERGY: MORE THAN HALF THE DAYS
3. TROUBLE FALLING OR STAYING ASLEEP: NOT AT ALL
10. IF YOU CHECKED OFF ANY PROBLEMS, HOW DIFFICULT HAVE THESE PROBLEMS MADE IT FOR YOU TO DO YOUR WORK, TAKE CARE OF THINGS AT HOME, OR GET ALONG WITH OTHER PEOPLE: NOT DIFFICULT AT ALL
8. MOVING OR SPEAKING SO SLOWLY THAT OTHER PEOPLE COULD HAVE NOTICED. OR THE OPPOSITE, BEING SO FIGETY OR RESTLESS THAT YOU HAVE BEEN MOVING AROUND A LOT MORE THAN USUAL: NOT AT ALL
7. TROUBLE CONCENTRATING ON THINGS, SUCH AS READING THE NEWSPAPER OR WATCHING TELEVISION: SEVERAL DAYS
SUM OF ALL RESPONSES TO PHQ QUESTIONS 1-9: 8
5. POOR APPETITE OR OVEREATING: SEVERAL DAYS
SUM OF ALL RESPONSES TO PHQ9 QUESTIONS 1 & 2: 3
SUM OF ALL RESPONSES TO PHQ QUESTIONS 1-9: 9
2. FEELING DOWN, DEPRESSED OR HOPELESS: MORE THAN HALF THE DAYS

## 2024-11-14 ASSESSMENT — ENCOUNTER SYMPTOMS
NAUSEA: 0
SHORTNESS OF BREATH: 0
RHINORRHEA: 0
SINUS PRESSURE: 1
VOMITING: 0
COUGH: 1
BACK PAIN: 1
ABDOMINAL PAIN: 0
CONSTIPATION: 0
SORE THROAT: 1
SINUS PAIN: 1
APNEA: 0
COLOR CHANGE: 0
DIARRHEA: 0
WHEEZING: 0

## 2024-11-14 NOTE — PROGRESS NOTES
Lona Oviedo (:  1985) is a 39 y.o. female, New patient, here for evaluation of the following chief complaint(s):  New Patient, Congestion (Head congestion, 3 weeks ago. Was put on amoxacillin. ), and Cough         Assessment & Plan  1. Sinus Infection.    Augmentin as prescribed for sinusitis.   Medrol gideon.   Reviewed over-the-counter symptomatic relievers.  Push fluids.  Rest.  Educated on signs of worsening illness and when to seek further care.      2. Depression.  The patient reports feeling down, tired, and having little interest in activities. She has a history of using Lexapro and Zoloft amongst multiple other therapies, which were not effective. GeneSight testing has been recommended to guide future treatment decisions but patient would like to hold off for now. She has been able to find ways to cope and stress relief on her own. She has no SI. She is advised to contact the office if her symptoms worsen or if she experiences suicidal thoughts.    3. Hx of prothrombin gene mutation. Discussed bleeding concerns with this mutation which patient is aware.     Discussed use, benefit, and side effects of prescribed medications.  Barriers to medication compliance addressed.  All patient questions answered.  Pt voiced understanding.     Results    1. Acute non-recurrent maxillary sinusitis  -     amoxicillin-clavulanate (AUGMENTIN) 875-125 MG per tablet; Take 1 tablet by mouth 2 times daily for 10 days, Disp-20 tablet, R-0Normal  -     methylPREDNISolone (MEDROL, GIDEON,) 4 MG tablet; Take with food., Disp-1 kit, R-0Normal  2. Depression with anxiety  3. Prothrombin gene mutation (HCC)    Return in about 3 months (around 2025) for Follow up.       Subjective   History of Present Illness  The patient presents for evaluation of multiple medical concerns.    She was prescribed amoxicillin last week due to a severe sinus infection that had been causing symptoms for approximately 3.5 weeks. She experienced

## 2025-02-03 ENCOUNTER — HOSPITAL ENCOUNTER (EMERGENCY)
Age: 40
Discharge: HOME | End: 2025-02-03
Attending: FAMILY MEDICINE
Payer: COMMERCIAL

## 2025-02-03 VITALS
DIASTOLIC BLOOD PRESSURE: 70 MMHG | HEART RATE: 86 BPM | RESPIRATION RATE: 18 BRPM | TEMPERATURE: 98 F | SYSTOLIC BLOOD PRESSURE: 106 MMHG | OXYGEN SATURATION: 100 %

## 2025-02-03 DIAGNOSIS — J06.9 VIRAL UPPER RESPIRATORY TRACT INFECTION: Primary | ICD-10-CM

## 2025-02-03 PROCEDURE — 99282 EMERGENCY DEPT VISIT SF MDM: CPT

## 2025-02-03 ASSESSMENT — ENCOUNTER SYMPTOMS
SHORTNESS OF BREATH: 0
EYE REDNESS: 0
EYE DISCHARGE: 0
SORE THROAT: 0
COUGH: 1
SINUS PRESSURE: 1

## 2025-02-03 ASSESSMENT — PAIN - FUNCTIONAL ASSESSMENT
PAIN_FUNCTIONAL_ASSESSMENT: NONE - DENIES PAIN
PAIN_FUNCTIONAL_ASSESSMENT: NONE - DENIES PAIN

## 2025-02-03 NOTE — ED NOTES
Patient presents to the ED via private auto with complaints of sinus congestion and ear pain.  Declines covid and flu testing stating she had two people in her family tested last week and was negative.

## 2025-02-03 NOTE — ED PROVIDER NOTES
[facility]  eMERGENCY dEPARTMENT eNCOUnter          CHIEF COMPLAINT       Chief Complaint   Patient presents with    sinus congestion    Ear Pain       Nurses Notes reviewed and I agree except as noted in the HPI.      HISTORY OF PRESENT ILLNESS    Lona Oviedo is a 39 y.o. female who presents with sinus congestion,bilateral ear pain. Symptoms started 5 days ago. No fever. Family with URI like symptoms.          REVIEW OF SYSTEMS     Review of Systems   Constitutional:  Negative for chills and fever.   HENT:  Positive for congestion, ear pain and sinus pressure. Negative for sore throat.    Eyes:  Negative for discharge and redness.   Respiratory:  Positive for cough. Negative for shortness of breath.    All other systems reviewed and are negative.        PAST MEDICAL HISTORY    has a past medical history of Anxiety, Blood clotting disorder (HCC), Chronic back pain, Depression, Headache, Heterozygous hemoglobin S (HCC), Hx of blood clots, Laceration, PONV (postoperative nausea and vomiting), Preeclampsia, severe, and Unspecified diseases of blood and blood-forming organs.    SURGICAL HISTORY      has a past surgical history that includes [place] tooth extraction; Uterine Suspension;  section (2013); Tonsillectomy; back surgery (); back surgery (); back surgery ();  section (); Hysterectomy (N/A, 2021); lumbar fusion (N/A, 2022); sinus surgery (2023); Hemorrhoid surgery (); and Hysterectomy, total abdominal.    CURRENT MEDICATIONS       Previous Medications    ALBUTEROL SULFATE HFA (VENTOLIN HFA) 108 (90 BASE) MCG/ACT INHALER    Inhale 2 puffs into the lungs 4 times daily as needed for Wheezing       ALLERGIES     is allergic to adhesive tape and morphine.    FAMILY HISTORY     She indicated that her mother is alive. She indicated that her father is alive. She indicated that her brother is alive. She indicated that the status of her maternal

## 2025-02-03 NOTE — DISCHARGE INSTRUCTIONS
Increase fluids.  Tylenol and Motrin for body ache.  If continued symptoms lasting at least 1 week follow-up with your primary care provider for further evaluation.

## 2025-08-04 SDOH — ECONOMIC STABILITY: FOOD INSECURITY: WITHIN THE PAST 12 MONTHS, YOU WORRIED THAT YOUR FOOD WOULD RUN OUT BEFORE YOU GOT MONEY TO BUY MORE.: NEVER TRUE

## 2025-08-04 SDOH — ECONOMIC STABILITY: FOOD INSECURITY: WITHIN THE PAST 12 MONTHS, THE FOOD YOU BOUGHT JUST DIDN'T LAST AND YOU DIDN'T HAVE MONEY TO GET MORE.: NEVER TRUE

## 2025-08-04 SDOH — ECONOMIC STABILITY: INCOME INSECURITY: IN THE LAST 12 MONTHS, WAS THERE A TIME WHEN YOU WERE NOT ABLE TO PAY THE MORTGAGE OR RENT ON TIME?: NO

## 2025-08-04 SDOH — ECONOMIC STABILITY: TRANSPORTATION INSECURITY
IN THE PAST 12 MONTHS, HAS THE LACK OF TRANSPORTATION KEPT YOU FROM MEDICAL APPOINTMENTS OR FROM GETTING MEDICATIONS?: NO

## 2025-08-04 SDOH — ECONOMIC STABILITY: TRANSPORTATION INSECURITY
IN THE PAST 12 MONTHS, HAS LACK OF TRANSPORTATION KEPT YOU FROM MEETINGS, WORK, OR FROM GETTING THINGS NEEDED FOR DAILY LIVING?: NO

## 2025-08-04 ASSESSMENT — PATIENT HEALTH QUESTIONNAIRE - PHQ9
1. LITTLE INTEREST OR PLEASURE IN DOING THINGS: SEVERAL DAYS
5. POOR APPETITE OR OVEREATING: NOT AT ALL
SUM OF ALL RESPONSES TO PHQ QUESTIONS 1-9: 5
SUM OF ALL RESPONSES TO PHQ QUESTIONS 1-9: 5
3. TROUBLE FALLING OR STAYING ASLEEP: SEVERAL DAYS
10. IF YOU CHECKED OFF ANY PROBLEMS, HOW DIFFICULT HAVE THESE PROBLEMS MADE IT FOR YOU TO DO YOUR WORK, TAKE CARE OF THINGS AT HOME, OR GET ALONG WITH OTHER PEOPLE: SOMEWHAT DIFFICULT
2. FEELING DOWN, DEPRESSED OR HOPELESS: SEVERAL DAYS
SUM OF ALL RESPONSES TO PHQ QUESTIONS 1-9: 5
7. TROUBLE CONCENTRATING ON THINGS, SUCH AS READING THE NEWSPAPER OR WATCHING TELEVISION: NOT AT ALL
4. FEELING TIRED OR HAVING LITTLE ENERGY: SEVERAL DAYS
5. POOR APPETITE OR OVEREATING: NOT AT ALL
8. MOVING OR SPEAKING SO SLOWLY THAT OTHER PEOPLE COULD HAVE NOTICED. OR THE OPPOSITE, BEING SO FIGETY OR RESTLESS THAT YOU HAVE BEEN MOVING AROUND A LOT MORE THAN USUAL: NOT AT ALL
7. TROUBLE CONCENTRATING ON THINGS, SUCH AS READING THE NEWSPAPER OR WATCHING TELEVISION: NOT AT ALL
1. LITTLE INTEREST OR PLEASURE IN DOING THINGS: SEVERAL DAYS
6. FEELING BAD ABOUT YOURSELF - OR THAT YOU ARE A FAILURE OR HAVE LET YOURSELF OR YOUR FAMILY DOWN: SEVERAL DAYS
4. FEELING TIRED OR HAVING LITTLE ENERGY: SEVERAL DAYS
9. THOUGHTS THAT YOU WOULD BE BETTER OFF DEAD, OR OF HURTING YOURSELF: NOT AT ALL
6. FEELING BAD ABOUT YOURSELF - OR THAT YOU ARE A FAILURE OR HAVE LET YOURSELF OR YOUR FAMILY DOWN: SEVERAL DAYS
2. FEELING DOWN, DEPRESSED OR HOPELESS: SEVERAL DAYS
9. THOUGHTS THAT YOU WOULD BE BETTER OFF DEAD, OR OF HURTING YOURSELF: NOT AT ALL
10. IF YOU CHECKED OFF ANY PROBLEMS, HOW DIFFICULT HAVE THESE PROBLEMS MADE IT FOR YOU TO DO YOUR WORK, TAKE CARE OF THINGS AT HOME, OR GET ALONG WITH OTHER PEOPLE: SOMEWHAT DIFFICULT
SUM OF ALL RESPONSES TO PHQ QUESTIONS 1-9: 5
8. MOVING OR SPEAKING SO SLOWLY THAT OTHER PEOPLE COULD HAVE NOTICED. OR THE OPPOSITE - BEING SO FIDGETY OR RESTLESS THAT YOU HAVE BEEN MOVING AROUND A LOT MORE THAN USUAL: NOT AT ALL
SUM OF ALL RESPONSES TO PHQ QUESTIONS 1-9: 5
3. TROUBLE FALLING OR STAYING ASLEEP: SEVERAL DAYS

## 2025-08-06 ENCOUNTER — OFFICE VISIT (OUTPATIENT)
Dept: FAMILY MEDICINE CLINIC | Age: 40
End: 2025-08-06
Payer: COMMERCIAL

## 2025-08-06 VITALS
RESPIRATION RATE: 16 BRPM | HEART RATE: 96 BPM | WEIGHT: 147 LBS | TEMPERATURE: 98.3 F | DIASTOLIC BLOOD PRESSURE: 80 MMHG | HEIGHT: 69 IN | SYSTOLIC BLOOD PRESSURE: 102 MMHG | BODY MASS INDEX: 21.77 KG/M2 | OXYGEN SATURATION: 99 %

## 2025-08-06 DIAGNOSIS — D68.52 PROTHROMBIN GENE MUTATION: ICD-10-CM

## 2025-08-06 DIAGNOSIS — R42 DIZZINESS: ICD-10-CM

## 2025-08-06 DIAGNOSIS — Z13.220 ENCOUNTER FOR SCREENING FOR LIPID DISORDER: ICD-10-CM

## 2025-08-06 DIAGNOSIS — F41.8 DEPRESSION WITH ANXIETY: ICD-10-CM

## 2025-08-06 DIAGNOSIS — R55 SYNCOPE, UNSPECIFIED SYNCOPE TYPE: Primary | ICD-10-CM

## 2025-08-06 DIAGNOSIS — R42 ORTHOSTATIC LIGHTHEADEDNESS: ICD-10-CM

## 2025-08-06 PROCEDURE — 99213 OFFICE O/P EST LOW 20 MIN: CPT

## 2025-08-06 PROCEDURE — G8420 CALC BMI NORM PARAMETERS: HCPCS

## 2025-08-06 PROCEDURE — 4004F PT TOBACCO SCREEN RCVD TLK: CPT

## 2025-08-06 PROCEDURE — G8427 DOCREV CUR MEDS BY ELIG CLIN: HCPCS

## 2025-08-06 ASSESSMENT — ENCOUNTER SYMPTOMS
BACK PAIN: 0
APNEA: 0
ABDOMINAL PAIN: 0
VOMITING: 0
SINUS PRESSURE: 0
NAUSEA: 0
WHEEZING: 0
SORE THROAT: 0
SHORTNESS OF BREATH: 0
DIARRHEA: 0
CONSTIPATION: 0
RHINORRHEA: 0
COUGH: 0
COLOR CHANGE: 0

## 2025-08-08 ENCOUNTER — HOSPITAL ENCOUNTER (OUTPATIENT)
Dept: GENERAL RADIOLOGY | Age: 40
Discharge: HOME | End: 2025-08-08
Payer: COMMERCIAL

## 2025-08-08 ENCOUNTER — HOSPITAL ENCOUNTER (OUTPATIENT)
Age: 40
End: 2025-08-08

## 2025-08-08 DIAGNOSIS — R42 ORTHOSTATIC LIGHTHEADEDNESS: ICD-10-CM

## 2025-08-08 DIAGNOSIS — R55 SYNCOPE, UNSPECIFIED SYNCOPE TYPE: ICD-10-CM

## 2025-08-08 DIAGNOSIS — Z13.220 ENCOUNTER FOR SCREENING FOR LIPID DISORDER: ICD-10-CM

## 2025-08-08 DIAGNOSIS — R42 DIZZINESS: ICD-10-CM

## 2025-08-08 LAB
ANION GAP SERPL CALC-SCNC: 8 MEQ/L (ref 8–16)
BASOPHILS ABSOLUTE: 0 THOU/MM3 (ref 0–0.1)
BASOPHILS NFR BLD AUTO: 1 %
BUN SERPL-MCNC: 12 MG/DL (ref 8–23)
CALCIUM SERPL-MCNC: 9.5 MG/DL (ref 8.6–10)
CHLORIDE SERPL-SCNC: 104 MEQ/L (ref 98–111)
CHOLEST SERPL-MCNC: 157 MG/DL (ref 100–199)
CO2 SERPL-SCNC: 26 MEQ/L (ref 22–29)
CREAT SERPL-MCNC: 0.7 MG/DL (ref 0.5–0.9)
DEPRECATED RDW RBC AUTO: 44.1 FL (ref 35–45)
EKG ATRIAL RATE: 59 BPM
EKG P AXIS: 51 DEGREES
EKG P-R INTERVAL: 154 MS
EKG Q-T INTERVAL: 382 MS
EKG QRS DURATION: 86 MS
EKG QTC CALCULATION (BAZETT): 378 MS
EKG R AXIS: 72 DEGREES
EKG T AXIS: 40 DEGREES
EKG VENTRICULAR RATE: 59 BPM
EOSINOPHIL NFR BLD AUTO: 1.9 %
EOSINOPHILS ABSOLUTE: 0.1 THOU/MM3 (ref 0–0.4)
ERYTHROCYTE [DISTWIDTH] IN BLOOD BY AUTOMATED COUNT: 12.9 % (ref 11.5–14.5)
GFR SERPL CREATININE-BSD FRML MDRD: > 90 ML/MIN/1.73M2
GLUCOSE SERPL-MCNC: 90 MG/DL (ref 74–109)
HCT VFR BLD AUTO: 40.4 % (ref 37–47)
HDLC SERPL-MCNC: 63 MG/DL
HGB BLD-MCNC: 13.2 GM/DL (ref 12–16)
IMM GRANULOCYTES # BLD AUTO: 0.01 THOU/MM3 (ref 0–0.07)
IMM GRANULOCYTES NFR BLD AUTO: 0.2 %
LDLC SERPL CALC-MCNC: 85 MG/DL
LYMPHOCYTES ABSOLUTE: 1.6 THOU/MM3 (ref 1–4.8)
LYMPHOCYTES NFR BLD AUTO: 34.2 %
MCH RBC QN AUTO: 31 PG (ref 26–33)
MCHC RBC AUTO-ENTMCNC: 32.7 GM/DL (ref 32.2–35.5)
MCV RBC AUTO: 94.8 FL (ref 81–99)
MONOCYTES ABSOLUTE: 0.4 THOU/MM3 (ref 0.4–1.3)
MONOCYTES NFR BLD AUTO: 8.1 %
NEUTROPHILS ABSOLUTE: 2.6 THOU/MM3 (ref 1.8–7.7)
NEUTROPHILS NFR BLD AUTO: 54.6 %
NRBC BLD AUTO-RTO: 0 /100 WBC
PLATELET # BLD AUTO: 160 THOU/MM3 (ref 130–400)
PMV BLD AUTO: 11.7 FL (ref 9.4–12.4)
POTASSIUM SERPL-SCNC: 4.5 MEQ/L (ref 3.5–5.2)
RBC # BLD AUTO: 4.26 MILL/MM3 (ref 4.2–5.4)
SODIUM SERPL-SCNC: 138 MEQ/L (ref 135–145)
TRIGL SERPL-MCNC: 47 MG/DL (ref 0–199)
TSH SERPL DL<=0.05 MIU/L-ACNC: 1.84 UIU/ML (ref 0.27–4.2)
WBC # BLD AUTO: 4.8 THOU/MM3 (ref 4.8–10.8)

## 2025-08-08 PROCEDURE — 84443 ASSAY THYROID STIM HORMONE: CPT

## 2025-08-08 PROCEDURE — 85025 COMPLETE CBC W/AUTO DIFF WBC: CPT

## 2025-08-08 PROCEDURE — 36415 COLL VENOUS BLD VENIPUNCTURE: CPT

## 2025-08-08 PROCEDURE — 80061 LIPID PANEL: CPT

## 2025-08-08 PROCEDURE — 80048 BASIC METABOLIC PNL TOTAL CA: CPT

## 2025-08-08 PROCEDURE — 93005 ELECTROCARDIOGRAM TRACING: CPT

## 2025-08-08 PROCEDURE — 93010 ELECTROCARDIOGRAM REPORT: CPT | Performed by: INTERNAL MEDICINE

## (undated) DEVICE — THE MILL DISPOSABLE - MEDIUM

## (undated) DEVICE — RESERVOIR BLD COLLCTN BTM OUTLETXTRAXRES

## (undated) DEVICE — CANNULA SEAL

## (undated) DEVICE — GLOVE ORANGE PI 8   MSG9080

## (undated) DEVICE — SUTURE VCRL SZ 4-0 L27IN ABSRB UD L19MM FS-2 3/8 CIR REV J422H

## (undated) DEVICE — SUTURE V-LOC 180 SZ 2-0 L12IN ABSRB VLT GS-21 L37MM 1/2 CIR VLOCM0315

## (undated) DEVICE — TIP COVER ACCESSORY

## (undated) DEVICE — Device

## (undated) DEVICE — 1010 S-DRAPE TOWEL DRAPE 10/BX: Brand: STERI-DRAPE™

## (undated) DEVICE — ARM DRAPE

## (undated) DEVICE — HEAD POSITIONER, SURGICAL: Brand: DEROYAL

## (undated) DEVICE — VCARE SMALL, UTERINE MANIPULATOR, VAGINAL-CERVICAL-AHLUWALIA'S-RETRACTOR-ELEVATOR: Brand: VCARE

## (undated) DEVICE — CATHETER ETER IV 16GA L125IN POLYUR STR HUB INTROCAN SFTY

## (undated) DEVICE — LINE ASPIR 1/4 ANTICOAG

## (undated) DEVICE — CAP TUBE YEL 13MM POLYETH EZEE-TOPPER

## (undated) DEVICE — SPONGE LAP W18XL18IN WHT COT 4 PLY FLD STRUNG RADPQ DISP ST

## (undated) DEVICE — BANDAGE ADH W1XL3IN NAT FAB WVN FLX DURABLE N ADH PD SEAL

## (undated) DEVICE — BUR RND FLUT AGRSV 5MM

## (undated) DEVICE — CODMAN® SURGICAL PATTIES 1" X 1" (2.54CM X 2.54CM): Brand: CODMAN®

## (undated) DEVICE — AIRSEAL 8 MM ACCESS PORT AND LOW PROFILE OBTURATOR WITH BLADELESS OPTICAL TIP, 120 MM LENGTH: Brand: AIRSEAL

## (undated) DEVICE — OBTURATOR ROBOTIC DIA8MM BLDELSS ENDOSCP DISP DA VINCI SI

## (undated) DEVICE — SET AUTOTRNS C175ML BOWL BTM OUTLT RESERVOIRXTRA

## (undated) DEVICE — VCARE MEDIUM, UTERINE MANIPULATOR, VAGINAL-CERVICAL-AHLUWALIA'S-RETRACTOR-ELEVATOR: Brand: VCARE

## (undated) DEVICE — ROYAL SILK SURGICAL GOWN, XXL: Brand: CONVERTORS

## (undated) DEVICE — AGENT HEMSTAT W2XL4IN OXIDIZED REGENERATED CELOS ABSRB SFT

## (undated) DEVICE — DRESSING,GAUZE,XEROFORM,CURAD,5"X9",ST: Brand: CURAD

## (undated) DEVICE — GOWN,SIRUS,NONRNF,SETINSLV,XL,20/CS: Brand: MEDLINE

## (undated) DEVICE — TROCAR: Brand: KII FIOS FIRST ENTRY

## (undated) DEVICE — GLOVE SURG SZ 65 THK91MIL LTX FREE SYN POLYISOPRENE

## (undated) DEVICE — TUBING, SUCTION, 1/4" X 12', STRAIGHT: Brand: MEDLINE

## (undated) DEVICE — PACK PROCEDURE SURG GYN ROBOTIC

## (undated) DEVICE — SOLUTION IV 1000ML 0.9% SOD CHL PH 5 INJ USP VIAFLX PLAS

## (undated) DEVICE — AGENT HEMSTAT 3GM PURIFIED PLNT STARCH PWD ABSRB ARISTA AH

## (undated) DEVICE — SUTURE ABSRB BRAID COAT UD CP NO 2 27IN VCRL J195H

## (undated) DEVICE — YANKAUER,BULB TIP,W/O VENT,RIGID,STERILE: Brand: MEDLINE

## (undated) DEVICE — BASIC SINGLE BASIN BTC-LF: Brand: MEDLINE INDUSTRIES, INC.

## (undated) DEVICE — PROBE STIM 3 MM FOR PEDCL SCREW DISP

## (undated) DEVICE — GLOVE SURG SZ 9 THK91MIL LTX FREE SYN POLYISOPRENE ANTI

## (undated) DEVICE — SUTURE ETHLN SZ 3-0 L18IN NONABSORBABLE BLK FS-1 L24MM 3/8 663H

## (undated) DEVICE — BAG,BANDED,W/RUBBERBAND,STERILE,30X36: Brand: MEDLINE

## (undated) DEVICE — PENCIL SMK EVAC ALL IN 1 DSGN ENH VISIBILITY IMPROVED AIR

## (undated) DEVICE — PACK-MAJOR

## (undated) DEVICE — GOWN,SIRUS,NON REINFRCD,LARGE,SET IN SL: Brand: MEDLINE

## (undated) DEVICE — VCARE LARGE, UTERINE MANIPULATOR, VAGINAL-CERVICAL-AHLUWALIA'S-RETRACTOR-ELEVATOR: Brand: VCARE

## (undated) DEVICE — BLADELESS OBTURATOR: Brand: WECK VISTA

## (undated) DEVICE — BIPOLAR SEALER 23-112-1 AQM 6.0: Brand: AQUAMANTYS ®

## (undated) DEVICE — SUTURE VCRL + SZ 2-0 L27IN ABSRB UD CP-1 1/2 CIR REV CUT VCP266H

## (undated) DEVICE — TRI-LUMEN FILTERED TUBE SET WITH ACTIVATED CHARCOAL FILTER: Brand: AIRSEAL

## (undated) DEVICE — C-ARMOR C-ARM EQUIPMENT COVERS CLEAR STERILE UNIVERSAL FIT 12 PER CASE: Brand: C-ARMOR

## (undated) DEVICE — KIT EVAC 400CC PVC RADPQ Y CONN

## (undated) DEVICE — DRAIN SURG 10FR PVC TB W/ TRCR MID PERF NO RESVR HUBLESS

## (undated) DEVICE — TOTAL TRAY, DB, 100% SILI FOLEY, 16FR 10: Brand: MEDLINE

## (undated) DEVICE — ELECTRO LUBE IS A SINGLE PATIENT USE DEVICE THAT IS INTENDED TO BE USED ON ELECTROSURGICAL ELECTRODES TO REDUCE STICKING.: Brand: KEY SURGICAL ELECTRO LUBE

## (undated) DEVICE — GLOVE ORANGE PI 8 1/2   MSG9085

## (undated) DEVICE — STRIP,CLOSURE,WOUND,MEDI-STRIP,1/2X4: Brand: MEDLINE

## (undated) DEVICE — SUTURE ETHLN SZ 2-0 L30IN NONABSORBABLE BLK L36MM FSLX 3/8 1674H

## (undated) DEVICE — PAD OP RM W20XL72XH2IN PRECIS CUT FLAT EC BIOCLINIC

## (undated) DEVICE — BLADE ES L6IN ELASTOMERIC COAT EXT DURABLE BEND UPTO 90DEG

## (undated) DEVICE — JCKSON TBL POSTNER NO HD REST: Brand: MEDLINE INDUSTRIES, INC.